# Patient Record
Sex: FEMALE | Race: OTHER | HISPANIC OR LATINO
[De-identification: names, ages, dates, MRNs, and addresses within clinical notes are randomized per-mention and may not be internally consistent; named-entity substitution may affect disease eponyms.]

---

## 2020-08-05 ENCOUNTER — APPOINTMENT (OUTPATIENT)
Dept: OBGYN | Facility: CLINIC | Age: 30
End: 2020-08-05

## 2020-09-02 ENCOUNTER — APPOINTMENT (OUTPATIENT)
Dept: OBGYN | Facility: CLINIC | Age: 30
End: 2020-09-02

## 2021-01-25 ENCOUNTER — APPOINTMENT (OUTPATIENT)
Dept: OBGYN | Facility: CLINIC | Age: 31
End: 2021-01-25
Payer: COMMERCIAL

## 2021-01-25 VITALS
DIASTOLIC BLOOD PRESSURE: 76 MMHG | WEIGHT: 161 LBS | HEIGHT: 64 IN | TEMPERATURE: 97 F | BODY MASS INDEX: 27.49 KG/M2 | SYSTOLIC BLOOD PRESSURE: 100 MMHG

## 2021-01-25 DIAGNOSIS — Z82.49 FAMILY HISTORY OF ISCHEMIC HEART DISEASE AND OTHER DISEASES OF THE CIRCULATORY SYSTEM: ICD-10-CM

## 2021-01-25 DIAGNOSIS — Z01.419 ENCOUNTER FOR GYNECOLOGICAL EXAMINATION (GENERAL) (ROUTINE) W/OUT ABNORMAL FINDINGS: ICD-10-CM

## 2021-01-25 PROCEDURE — 36415 COLL VENOUS BLD VENIPUNCTURE: CPT

## 2021-01-25 PROCEDURE — 99072 ADDL SUPL MATRL&STAF TM PHE: CPT

## 2021-01-25 PROCEDURE — 99385 PREV VISIT NEW AGE 18-39: CPT

## 2021-01-25 RX ORDER — MULTIVITAMIN
TABLET ORAL
Refills: 0 | Status: ACTIVE | COMMUNITY

## 2021-01-25 RX ORDER — CHROMIUM 200 MCG
TABLET ORAL
Refills: 0 | Status: ACTIVE | COMMUNITY

## 2021-01-25 NOTE — HISTORY OF PRESENT ILLNESS
[Patient reported PAP Smear was normal] : Patient reported PAP Smear was normal [LMP unknown] : LMP unknown [IUD] : has an intrauterine device [N] : Patient is not sexually active [Y] : Positive pregnancy history [unknown] : Patient is unsure of the date of her LMP [Menarche Age: ____] : age at menarche was [unfilled] [Previously active] : previously active [Men] : men [Vaginal] : vaginal [Patient would like to be screened for STIs] : Patient would like to be screened for STIs [TextBox_4] : 30yo P1 LMP Mirena IUD ( inserted 1 year ago)  new pt presents for annual GYN exam requesting full STD testing\par \par Pt had a Mirena IUD prior to this one- this IUD causing more spotting = pt states she gets an annual pelvic sono at prior practice requesting to continue that protocol\par \par Pt denies any history of abnormal pap smears [PapSmeardate] : 01/01/20 [de-identified] : MIRENA [TextBox_31] : AS PER PATIENT  [PGxTotal] : 1 [[facility] [[facility]] : [facility]

## 2021-01-26 LAB
C TRACH RRNA SPEC QL NAA+PROBE: NOT DETECTED
HAV IGM SER QL: NONREACTIVE
HBV CORE IGM SER QL: NONREACTIVE
HBV SURFACE AG SER QL: NONREACTIVE
HCV AB SER QL: NONREACTIVE
HCV S/CO RATIO: 0.1 S/CO
HIV1+2 AB SPEC QL IA.RAPID: NONREACTIVE
HSV 1+2 IGG SER IA-IMP: NEGATIVE
HSV 1+2 IGG SER IA-IMP: POSITIVE
HSV1 IGG SER QL: 4.27 INDEX
HSV2 IGG SER QL: 0.15 INDEX
N GONORRHOEA RRNA SPEC QL NAA+PROBE: NOT DETECTED
SOURCE TP AMPLIFICATION: NORMAL
T PALLIDUM AB SER QL IA: NEGATIVE

## 2021-01-29 LAB — CYTOLOGY CVX/VAG DOC THIN PREP: ABNORMAL

## 2021-02-23 ENCOUNTER — APPOINTMENT (OUTPATIENT)
Dept: OBGYN | Facility: CLINIC | Age: 31
End: 2021-02-23

## 2021-03-01 ENCOUNTER — APPOINTMENT (OUTPATIENT)
Dept: OBGYN | Facility: CLINIC | Age: 31
End: 2021-03-01

## 2021-03-16 ENCOUNTER — APPOINTMENT (OUTPATIENT)
Dept: OBGYN | Facility: CLINIC | Age: 31
End: 2021-03-16
Payer: COMMERCIAL

## 2021-03-16 ENCOUNTER — ASOB RESULT (OUTPATIENT)
Age: 31
End: 2021-03-16

## 2021-03-16 VITALS
BODY MASS INDEX: 27.31 KG/M2 | HEIGHT: 64 IN | TEMPERATURE: 97 F | DIASTOLIC BLOOD PRESSURE: 65 MMHG | WEIGHT: 160 LBS | SYSTOLIC BLOOD PRESSURE: 110 MMHG

## 2021-03-16 PROCEDURE — 76830 TRANSVAGINAL US NON-OB: CPT

## 2021-03-16 PROCEDURE — 99072 ADDL SUPL MATRL&STAF TM PHE: CPT

## 2021-03-16 PROCEDURE — 99212 OFFICE O/P EST SF 10 MIN: CPT | Mod: 25

## 2021-03-16 PROCEDURE — 76376 3D RENDER W/INTRP POSTPROCES: CPT | Mod: 59

## 2021-03-16 NOTE — COUNSELING
[Contraception/ Emergency Contraception/ Safe Sexual Practices] : contraception, emergency contraception, safe sexual practices [STD (testing, results, tx)] : STD (testing, results, tx) [FreeTextEntry2] : HIV  post test counseling performed

## 2021-03-16 NOTE — HISTORY OF PRESENT ILLNESS
[Patient reported PAP Smear was normal] : Patient reported PAP Smear was normal [Currently Active] : currently active [Men] : men [TextBox_4] : Pt presents for pelvic sonogram results - scan ordered for spotting with IUD- mirena\par \par Pt made aware her IUD( mirena IUD inserted 1 yr ago) is in situ-normal uterus and right ovary with small 2.3 cm complex left ovarian cyst\par \par Pt denies any pelvic pain presently- wishes to continue with IUD-\par \par Pt reassured\par \par STD screening results reviewed- pt with hx oral HSV- safe sex practices reviewed [PapSmeardate] : 01/25/2021 [TextBox_31] : neg

## 2021-03-19 ENCOUNTER — APPOINTMENT (OUTPATIENT)
Dept: FAMILY MEDICINE | Facility: CLINIC | Age: 31
End: 2021-03-19

## 2021-09-14 ENCOUNTER — APPOINTMENT (OUTPATIENT)
Dept: OBGYN | Facility: CLINIC | Age: 31
End: 2021-09-14
Payer: COMMERCIAL

## 2021-09-14 ENCOUNTER — NON-APPOINTMENT (OUTPATIENT)
Age: 31
End: 2021-09-14

## 2021-09-14 ENCOUNTER — ASOB RESULT (OUTPATIENT)
Age: 31
End: 2021-09-14

## 2021-09-14 VITALS
HEIGHT: 64 IN | DIASTOLIC BLOOD PRESSURE: 72 MMHG | SYSTOLIC BLOOD PRESSURE: 118 MMHG | BODY MASS INDEX: 27.66 KG/M2 | WEIGHT: 162 LBS

## 2021-09-14 DIAGNOSIS — Z30.431 ENCOUNTER FOR ROUTINE CHECKING OF INTRAUTERINE CONTRACEPTIVE DEVICE: ICD-10-CM

## 2021-09-14 PROCEDURE — 99212 OFFICE O/P EST SF 10 MIN: CPT | Mod: 25

## 2021-09-14 PROCEDURE — 76830 TRANSVAGINAL US NON-OB: CPT

## 2021-09-14 PROCEDURE — 76376 3D RENDER W/INTRP POSTPROCES: CPT | Mod: 59

## 2021-09-14 NOTE — HISTORY OF PRESENT ILLNESS
[IUD] : has an intrauterine device [Y] : Positive pregnancy history [Menarche Age: ____] : age at menarche was [unfilled] [Currently Active] : currently active [Men] : men [Vaginal] : vaginal [No] : No [Patient refuses STI testing] : Patient refuses STI testing [LMP unknown] : LMP unknown [unknown] : Patient is unsure of the date of her LMP [PapSmeardate] : 1/25/21 [TextBox_4] : Patient presents for pelvic sonogram results for IUD check and ovarian cyst surveillance\par \par  patient is feeling well denies any pelvic pain or heavy bleeding\par \par \par She is very happy with her Mirena IUD inserted in 2020\par \par Pelvic sonogram is normal with IUD noted to be in situ and normal ovaries and uterus-resolution of previously noted ovarian cyst\par \par  [TextBox_31] : neg [GonorrheaDate] : 1/25/21 [TextBox_63] : neg [ChlamydiaDate] : 1/25/21 [TextBox_68] : neg [PGxTotal] : 1 [[facility]] : 1 [[facility]] : 1

## 2021-09-14 NOTE — PLAN
[FreeTextEntry1] : Follow-up for annual exam in February 2022-patient wishes to have a screening pelvic ultrasound annually at the time of her Pap to check her IUD\par \par Warning signs for malpositioned IUD were reviewed with the patient who verbalized good understanding

## 2022-04-01 ENCOUNTER — APPOINTMENT (OUTPATIENT)
Dept: POPULATION HEALTH | Facility: CLINIC | Age: 32
End: 2022-04-01

## 2022-10-03 ENCOUNTER — LABORATORY RESULT (OUTPATIENT)
Age: 32
End: 2022-10-03

## 2022-10-03 ENCOUNTER — APPOINTMENT (OUTPATIENT)
Dept: OBGYN | Facility: CLINIC | Age: 32
End: 2022-10-03

## 2022-10-03 VITALS
BODY MASS INDEX: 27.83 KG/M2 | HEIGHT: 64 IN | WEIGHT: 163 LBS | DIASTOLIC BLOOD PRESSURE: 62 MMHG | SYSTOLIC BLOOD PRESSURE: 124 MMHG

## 2022-10-03 DIAGNOSIS — Z01.419 ENCOUNTER FOR GYNECOLOGICAL EXAMINATION (GENERAL) (ROUTINE) W/OUT ABNORMAL FINDINGS: ICD-10-CM

## 2022-10-03 DIAGNOSIS — N94.10 UNSPECIFIED DYSPAREUNIA: ICD-10-CM

## 2022-10-03 DIAGNOSIS — A64 UNSPECIFIED SEXUALLY TRANSMITTED DISEASE: ICD-10-CM

## 2022-10-03 LAB
BILIRUB UR QL STRIP: NORMAL
GLUCOSE UR-MCNC: NORMAL
HCG UR QL: 1 EU/DL
HGB UR QL STRIP.AUTO: ABNORMAL
KETONES UR-MCNC: ABNORMAL
LEUKOCYTE ESTERASE UR QL STRIP: NORMAL
NITRITE UR QL STRIP: NORMAL
PH UR STRIP: 5.5
PROT UR STRIP-MCNC: NORMAL
SP GR UR STRIP: 1.02

## 2022-10-03 PROCEDURE — 81003 URINALYSIS AUTO W/O SCOPE: CPT | Mod: QW

## 2022-10-03 PROCEDURE — 99395 PREV VISIT EST AGE 18-39: CPT

## 2022-10-03 NOTE — PLAN
[FreeTextEntry1] : Follow-up in 1 year or sooner as needed follow-up for pelvic sonogram for any pain with sexual intercourse reviewed with patient.  Warning signs and precautions for immediate ER attention were outlined\par \par

## 2022-10-03 NOTE — PHYSICAL EXAM
[Chaperone Present] : A chaperone was present in the examining room during all aspects of the physical examination [Appropriately responsive] : appropriately responsive [Alert] : alert [No Acute Distress] : no acute distress [No Lymphadenopathy] : no lymphadenopathy [Regular Rate Rhythm] : regular rate rhythm [No Murmurs] : no murmurs [Clear to Auscultation B/L] : clear to auscultation bilaterally [Soft] : soft [Non-tender] : non-tender [Non-distended] : non-distended [No HSM] : No HSM [No Lesions] : no lesions [No Mass] : no mass [Oriented x3] : oriented x3 [Examination Of The Breasts] : a normal appearance [No Masses] : no breast masses were palpable [Labia Majora] : normal [Labia Minora] : normal [IUD String] : an IUD string was noted [Normal] : normal [Uterine Adnexae] : normal [FreeTextEntry6] : No rebound no guarding no pain elicited on exam no sign of any acute abdominal symptoms

## 2022-10-03 NOTE — HISTORY OF PRESENT ILLNESS
[IUD] : has an intrauterine device [Y] : Positive pregnancy history [Menarche Age: ____] : age at menarche was [unfilled] [Currently Active] : currently active [LMP unknown] : LMP unknown [unknown] : Patient is unsure of the date of her LMP [FreeTextEntry1] : 32-year-old P1 LMP Mirena IUD inserted 2020 at outside practice presents for annual gynecological exam.  She has been very satisfied with her IUD this is her second Mirena.  Patient denies any heavy bleeding and has intermittent spotting\par \par She gets some mild cramping in the lower mid pelvic area after intercourse at times.  She does have a history of an ovarian cyst in the past.  She has no present pain symptoms currently.  I did advise she have a sonogram to check the placement of her IUD for any pelvic pain and patient verbalizes good understanding.\par \par Patient states she is currently being treated for a UTI and a bowel infection with Keflex ordered by her GI.  We will send urine culture for precaution.  She was started on IBS medication and they are planning to possibly do a colonoscopy related to her symptoms of constipation\par \par  [PapSmeardate] : 01/25/21 [TextBox_31] : NEG [GonorrheaDate] : 01/25/21 [TextBox_63] : NEG [ChlamydiaDate] : 01/25/21 [TextBox_68] : NEG [de-identified] : MIRENA-2020 [PGxTotal] : 1 [[facility]] : 1 [[facility]] : 1

## 2022-10-16 LAB
CYTOLOGY CVX/VAG DOC THIN PREP: ABNORMAL
HPV HIGH+LOW RISK DNA PNL CVX: DETECTED

## 2022-10-18 ENCOUNTER — NON-APPOINTMENT (OUTPATIENT)
Age: 32
End: 2022-10-18

## 2022-10-27 ENCOUNTER — APPOINTMENT (OUTPATIENT)
Dept: OBGYN | Facility: CLINIC | Age: 32
End: 2022-10-27

## 2022-10-27 VITALS
DIASTOLIC BLOOD PRESSURE: 74 MMHG | WEIGHT: 160 LBS | BODY MASS INDEX: 27.31 KG/M2 | SYSTOLIC BLOOD PRESSURE: 114 MMHG | HEIGHT: 64 IN

## 2022-10-27 LAB
HCG UR QL: NEGATIVE
QUALITY CONTROL: YES

## 2022-10-27 PROCEDURE — 57454 BX/CURETT OF CERVIX W/SCOPE: CPT

## 2022-10-27 PROCEDURE — 81025 URINE PREGNANCY TEST: CPT

## 2022-10-27 NOTE — PROCEDURE
[Colposcopy] : Colposcopy  [ASCUS] : ASCUS [HPV High Risk] : HPV high risk [Risks] : risks [Benefits] : benefits [Alternatives] : alternatives [Patient] : patient [Infection] : infection [Bleeding] : bleeding [Allergic Reaction] : allergic reaction [No Premedication] : no premedication [Colposcopy Adequate] : colposcopy adequate [Pap Performed] : pap performed [SCI Fully Visualized] : SCI fully visualized [ECC Performed] : ECC performed [No Abnormalities] : no abnormalities [Biopsy] : biopsy taken [Hemostasis Obtained] : Hemostasis obtained [Tolerated Well] : the patient tolerated the procedure well [de-identified] : 2 [de-identified] : ECC\par 7 oclock [de-identified] : acetowhite lesion note at 7oclock

## 2022-11-03 LAB — CORE LAB BIOPSY: NORMAL

## 2022-11-07 ENCOUNTER — APPOINTMENT (OUTPATIENT)
Dept: ANTEPARTUM | Facility: CLINIC | Age: 32
End: 2022-11-07

## 2022-11-07 ENCOUNTER — APPOINTMENT (OUTPATIENT)
Dept: OBGYN | Facility: CLINIC | Age: 32
End: 2022-11-07

## 2022-11-18 ENCOUNTER — NON-APPOINTMENT (OUTPATIENT)
Age: 32
End: 2022-11-18

## 2022-11-21 ENCOUNTER — NON-APPOINTMENT (OUTPATIENT)
Age: 32
End: 2022-11-21

## 2023-02-07 ENCOUNTER — APPOINTMENT (OUTPATIENT)
Dept: ANTEPARTUM | Facility: CLINIC | Age: 33
End: 2023-02-07
Payer: COMMERCIAL

## 2023-02-07 ENCOUNTER — APPOINTMENT (OUTPATIENT)
Dept: OBGYN | Facility: CLINIC | Age: 33
End: 2023-02-07

## 2023-02-07 PROCEDURE — 76830 TRANSVAGINAL US NON-OB: CPT

## 2023-03-08 ENCOUNTER — NON-APPOINTMENT (OUTPATIENT)
Age: 33
End: 2023-03-08

## 2023-04-29 ENCOUNTER — LABORATORY RESULT (OUTPATIENT)
Age: 33
End: 2023-04-29

## 2023-04-29 ENCOUNTER — APPOINTMENT (OUTPATIENT)
Dept: OBGYN | Facility: CLINIC | Age: 33
End: 2023-04-29
Payer: COMMERCIAL

## 2023-04-29 VITALS
SYSTOLIC BLOOD PRESSURE: 116 MMHG | WEIGHT: 160 LBS | BODY MASS INDEX: 27.31 KG/M2 | HEIGHT: 64 IN | DIASTOLIC BLOOD PRESSURE: 72 MMHG

## 2023-04-29 DIAGNOSIS — Z12.4 ENCOUNTER FOR SCREENING FOR MALIGNANT NEOPLASM OF CERVIX: ICD-10-CM

## 2023-04-29 DIAGNOSIS — R87.810 ATYPICAL SQUAMOUS CELLS OF UNDETERMINED SIGNIFICANCE ON CYTOLOGIC SMEAR OF CERVIX (ASC-US): ICD-10-CM

## 2023-04-29 DIAGNOSIS — R87.610 ATYPICAL SQUAMOUS CELLS OF UNDETERMINED SIGNIFICANCE ON CYTOLOGIC SMEAR OF CERVIX (ASC-US): ICD-10-CM

## 2023-04-29 PROCEDURE — 99214 OFFICE O/P EST MOD 30 MIN: CPT

## 2023-04-29 NOTE — REASON FOR VISIT
Sickle Cell Crisis: Care Instructions  Your Care Instructions    Sickle cell crisis is a painful episode that may begin suddenly in a person with sickle cell disease. Sickle cell disease turns normal, round red blood cells into cells that look like jeane or crescent moons. The sickle-shaped cells can get stuck in blood vessels, blocking blood flow and causing severe pain. The pain can occur in the bones of the spine, the arms and legs, the chest, and the abdomen. An episode may be called a \"painful event\" or \"painful crisis. \" Some people who have sickle cell disease have many painful events, while others have few or none. Treatment depends on the level of pain and how long it lasts. Sometimes taking nonprescription pain relievers can help. Or you may need stronger pain relief medicine that is prescribed or given by a doctor. You may need to be treated in the hospital.  It isn't always possible to know what sets off a painful event. But triggers include being dehydrated, cold temperatures, infection, stress, and not getting enough oxygen. Follow-up care is a key part of your treatment and safety. Be sure to make and go to all appointments, and call your doctor if you are having problems. It's also a good idea to know your test results and keep a list of the medicines you take. How can you care for yourself at home? · Create a pain management plan with your doctor. This plan should include the types of medicines you can take and other actions you can take at home to relieve pain. · Drink plenty of fluids, enough so that your urine is light yellow or clear like water. If you have kidney, heart, or liver disease and have to limit fluids, talk with your doctor before you increase the amount of fluids you drink. · Take your medicines exactly as prescribed. Call your doctor if you think you are having a problem with your medicine. · Take pain medicines exactly as directed.   ¨ If the doctor gave you a prescription medicine for pain, take it as prescribed. ¨ If you are not taking a prescription pain medicine, ask your doctor if you can take an over-the-counter medicine. · Avoid alcohol. It can make you dehydrated. · Dress warmly in cold weather. The cold and windy weather can lead to severe pain. · Do not smoke. Smoking can reduce the amount of oxygen in your blood. · Get plenty of sleep. When should you call for help? Call 911 anytime you think you may need emergency care. For example, call if:  · You passed out (lost consciousness). Call your doctor now or seek immediate medical care if:  · You are in severe pain. · You are dizzy or lightheaded, or you feel like you may faint. · You have a fever. · You are short of breath. · Your symptoms are getting worse. Watch closely for changes in your health, and be sure to contact your doctor if you are not getting better as expected. Where can you learn more? Go to http://manuel-amadeo.info/. Enter F104 in the search box to learn more about \"Sickle Cell Crisis: Care Instructions. \"  Current as of: October 13, 2016  Content Version: 11.3  © 5596-7050 [employer], [employer]. Care instructions adapted under license by [employer] (which disclaims liability or warranty for this information). If you have questions about a medical condition or this instruction, always ask your healthcare professional. [place] [address] any warranty or liability for your use of this information. [Follow-Up] : a follow-up evaluation of [FreeTextEntry2] : 6mo rpt pap

## 2023-04-29 NOTE — HISTORY OF PRESENT ILLNESS
[LMP unknown] : LMP unknown [N] : Patient reports normal menses [IUD] : has an intrauterine device [Y] : Positive pregnancy history [unknown] : Patient is unsure of the date of her LMP [Menarche Age: ____] : age at menarche was [unfilled] [No] : Patient does not have concerns regarding sex [Currently Active] : currently active [FreeTextEntry1] : Pt presents for repeat pap-6 month states she was advised by Dr. Mckenzie to have 6 month pap s/p colpo 10/27/22\par \par Pap 10/3/22 ASCUS HPV pos \par Colpo 10/27/22 colpo- benign\par \par Denies AUB or PCB [PapSmeardate] : 10/03/22 [TextBox_31] : ASCUS [HIVDate] : 01/25/21 [TextBox_53] : NEG [SyphilisDate] : 01/25/21 [TextBox_58] : NEG [GonorrheaDate] : 01/25/21 [TextBox_63] : NEG [ChlamydiaDate] : 01/25/21 [TextBox_68] : NEG [HPVDate] : 10/03/22 [TextBox_78] : 18, [birth year] - NEG, HPVHR - DETECTED [HepatitisBDate] : 01/25/22 [TextBox_83] : NEG [HepatitisCDate] : 01/25/22 [TextBox_88] : NEG  [de-identified] : Mirena 2020 [PGxTotal] : 1 [[facility]] : 1 [[facility]] : [facility]

## 2023-05-13 ENCOUNTER — NON-APPOINTMENT (OUTPATIENT)
Age: 33
End: 2023-05-13

## 2023-05-29 ENCOUNTER — EMERGENCY (EMERGENCY)
Facility: HOSPITAL | Age: 33
LOS: 1 days | Discharge: HOME | End: 2023-05-29
Attending: EMERGENCY MEDICINE
Payer: COMMERCIAL

## 2023-05-29 VITALS
SYSTOLIC BLOOD PRESSURE: 118 MMHG | WEIGHT: 134.92 LBS | OXYGEN SATURATION: 99 % | HEART RATE: 91 BPM | DIASTOLIC BLOOD PRESSURE: 75 MMHG | TEMPERATURE: 99 F | RESPIRATION RATE: 18 BRPM | HEIGHT: 68 IN

## 2023-05-29 LAB
APPEARANCE UR: CLEAR — SIGNIFICANT CHANGE UP
BACTERIA # UR AUTO: ABNORMAL
BILIRUB UR-MCNC: NEGATIVE — SIGNIFICANT CHANGE UP
COLOR SPEC: YELLOW — SIGNIFICANT CHANGE UP
DIFF PNL FLD: NEGATIVE — SIGNIFICANT CHANGE UP
EPI CELLS # UR: SIGNIFICANT CHANGE UP
GLUCOSE UR QL: NEGATIVE — SIGNIFICANT CHANGE UP
HCG UR QL: NEGATIVE — SIGNIFICANT CHANGE UP
KETONES UR-MCNC: NEGATIVE — SIGNIFICANT CHANGE UP
LEUKOCYTE ESTERASE UR-ACNC: NEGATIVE — SIGNIFICANT CHANGE UP
NITRITE UR-MCNC: POSITIVE
PH UR: 8 — SIGNIFICANT CHANGE UP (ref 5–8)
PROT UR-MCNC: NEGATIVE — SIGNIFICANT CHANGE UP
RBC CASTS # UR COMP ASSIST: SIGNIFICANT CHANGE UP /HPF (ref 0–4)
SP GR SPEC: 1.01 — SIGNIFICANT CHANGE UP (ref 1.01–1.02)
UROBILINOGEN FLD QL: 1
WBC UR QL: SIGNIFICANT CHANGE UP /HPF (ref 0–5)

## 2023-05-29 PROCEDURE — 81001 URINALYSIS AUTO W/SCOPE: CPT

## 2023-05-29 PROCEDURE — 99284 EMERGENCY DEPT VISIT MOD MDM: CPT

## 2023-05-29 PROCEDURE — 76830 TRANSVAGINAL US NON-OB: CPT | Mod: 26

## 2023-05-29 PROCEDURE — 99284 EMERGENCY DEPT VISIT MOD MDM: CPT | Mod: 25

## 2023-05-29 PROCEDURE — 76856 US EXAM PELVIC COMPLETE: CPT

## 2023-05-29 PROCEDURE — 81025 URINE PREGNANCY TEST: CPT

## 2023-05-29 PROCEDURE — 76856 US EXAM PELVIC COMPLETE: CPT | Mod: 26

## 2023-05-29 PROCEDURE — 87186 SC STD MICRODIL/AGAR DIL: CPT

## 2023-05-29 PROCEDURE — 76830 TRANSVAGINAL US NON-OB: CPT

## 2023-05-29 PROCEDURE — 87086 URINE CULTURE/COLONY COUNT: CPT

## 2023-05-29 RX ORDER — IBUPROFEN 200 MG
800 TABLET ORAL ONCE
Refills: 0 | Status: COMPLETED | OUTPATIENT
Start: 2023-05-29 | End: 2023-05-29

## 2023-05-29 RX ADMIN — Medication 800 MILLIGRAM(S): at 20:57

## 2023-05-29 NOTE — ED ADULT TRIAGE NOTE - HEIGHT IN CM
Test of cure will be sent out today and you will be notified of results.  Remember to always use condoms when engaging in sexual activity.  Follow up with PCP as needed.  
172.72

## 2023-05-29 NOTE — ED STATDOCS - PATIENT PORTAL LINK FT
You can access the FollowMyHealth Patient Portal offered by [facility] by registering at the following website: [email or web address]. By joining [employer]’[facility] FollowMyHealth portal, you will also be able to view your health information using other applications (apps) compatible with our system.

## 2023-05-29 NOTE — ED ADULT TRIAGE NOTE - CHIEF COMPLAINT QUOTE
pt a+ox3, c/o 10/10 pelvic pain x 1 hour. states she has IUD in place but cannot feel string any more.

## 2023-05-29 NOTE — ED ADULT NURSE NOTE - NSFALLUNIVINTERV_ED_ALL_ED
Bed/Stretcher in lowest position, wheels locked, appropriate side rails in place/Call bell, personal items and telephone in reach/Instruct patient to call for assistance before getting out of bed/chair/stretcher/Non-slip footwear applied when patient is off stretcher/[place] to call system/Physically safe environment - no spills, clutter or unnecessary equipment/Purposeful proactive rounding/Room/bathroom lighting operational, light cord in reach

## 2023-05-29 NOTE — ED STATDOCS - OBJECTIVE STATEMENT
32 y/o female with PMHx of ovarian cyst presents to the ED c/o intermittent pelvic cramping for the past few hours. Pt states pain started during intercourse earlier this evening. Pt took 1000mg Tylenol PTA. Pt notes hx of IUD, states she can no longer feel string which she states she normally can.

## 2023-05-29 NOTE — ED STATDOCS - CLINICAL SUMMARY MEDICAL DECISION MAKING FREE TEXT BOX
34 y/o female presents to the ED c/o pelvic pain will check UA, US to evaluate for ovarian cyst vs r/o torsion 34 y/o female presents to the ED c/o pelvic pain will check UA, US to evaluate for ovarian cyst vs r/o torsion    HARLEY Box [id number]: Unremarkable work up. Medically stable for discharge.

## 2023-05-29 NOTE — ED STATDOCS - PROGRESS NOTE DETAILS
HARLEY Box: Patient evaluated by intake physician. HPI/ROS/PE as noted above. Will follow up plan per intake physician and continue to assess patient. HARLEY Box: Pain resolved.

## 2023-05-29 NOTE — ED ADULT TRIAGE NOTE - GLASGOW COMA SCALE: EYE OPENING, MLM
Please be informed that you have only been offered emergency medicaid which will be unable to cover aftercare treatment. The clinic is requesting proof that you are not working, if not the cost to the visit is $80.00. Discharge summary will be faxed following your discharge./Mental Health Treatment (E4) spontaneous

## 2023-05-29 NOTE — ED STATDOCS - NS ED ATTENDING STATEMENT MOD
This was a shared visit with the DOMO. I reviewed and verified the documentation and independently performed the documented:

## 2023-05-29 NOTE — ED STATDOCS - ATTENDING APP SHARED VISIT CONTRIBUTION OF CARE
This was a shared visit with DOMO. I reviewed and verified the documentation and independently performed the documented history/exam/mdm.

## 2023-06-06 ENCOUNTER — NON-APPOINTMENT (OUTPATIENT)
Age: 33
End: 2023-06-06

## 2023-06-06 LAB
CYTOLOGY CVX/VAG DOC THIN PREP: ABNORMAL
HPV HIGH+LOW RISK DNA PNL CVX: DETECTED

## 2023-06-07 RX ORDER — CEPHALEXIN 500 MG
1 CAPSULE ORAL
Qty: 28 | Refills: 0
Start: 2023-06-07 | End: 2023-06-13

## 2023-06-07 NOTE — ED POST DISCHARGE NOTE - RESULT SUMMARY
Pt called for VM that was left. Urine cx >100K E coli pan sensitive. Rx sent for keflex. Confirmed no allergies per pt.

## 2024-04-17 ENCOUNTER — INPATIENT (INPATIENT)
Facility: HOSPITAL | Age: 34
LOS: 5 days | Discharge: HOME | DRG: 440 | End: 2024-04-23
Attending: SURGERY | Admitting: HOSPITALIST
Payer: COMMERCIAL

## 2024-04-17 VITALS
WEIGHT: 167.55 LBS | TEMPERATURE: 98 F | OXYGEN SATURATION: 98 % | SYSTOLIC BLOOD PRESSURE: 126 MMHG | HEART RATE: 67 BPM | RESPIRATION RATE: 20 BRPM | HEIGHT: 64 IN | DIASTOLIC BLOOD PRESSURE: 72 MMHG

## 2024-04-17 LAB
ALBUMIN SERPL ELPH-MCNC: 4.5 G/DL — SIGNIFICANT CHANGE UP (ref 3.3–5.2)
ALP SERPL-CCNC: 102 U/L — SIGNIFICANT CHANGE UP (ref 40–120)
ALT FLD-CCNC: 245 U/L — HIGH
ANION GAP SERPL CALC-SCNC: 17 MMOL/L — SIGNIFICANT CHANGE UP (ref 5–17)
AST SERPL-CCNC: 400 U/L — HIGH
BASOPHILS # BLD AUTO: 0.06 K/UL — SIGNIFICANT CHANGE UP (ref 0–0.2)
BASOPHILS NFR BLD AUTO: 0.3 % — SIGNIFICANT CHANGE UP (ref 0–2)
BILIRUB SERPL-MCNC: 1.5 MG/DL — SIGNIFICANT CHANGE UP (ref 0.4–2)
BUN SERPL-MCNC: 15.2 MG/DL — SIGNIFICANT CHANGE UP (ref 8–20)
CALCIUM SERPL-MCNC: 9.7 MG/DL — SIGNIFICANT CHANGE UP (ref 8.4–10.5)
CHLORIDE SERPL-SCNC: 99 MMOL/L — SIGNIFICANT CHANGE UP (ref 96–108)
CO2 SERPL-SCNC: 23 MMOL/L — SIGNIFICANT CHANGE UP (ref 22–29)
CREAT SERPL-MCNC: 0.79 MG/DL — SIGNIFICANT CHANGE UP (ref 0.5–1.3)
EGFR: 102 ML/MIN/1.73M2 — SIGNIFICANT CHANGE UP
EOSINOPHIL # BLD AUTO: 0.05 K/UL — SIGNIFICANT CHANGE UP (ref 0–0.5)
EOSINOPHIL NFR BLD AUTO: 0.2 % — SIGNIFICANT CHANGE UP (ref 0–6)
GLUCOSE SERPL-MCNC: 114 MG/DL — HIGH (ref 70–99)
HCG SERPL-ACNC: <4 MIU/ML — SIGNIFICANT CHANGE UP
HCT VFR BLD CALC: 37.2 % — SIGNIFICANT CHANGE UP (ref 34.5–45)
HGB BLD-MCNC: 12.9 G/DL — SIGNIFICANT CHANGE UP (ref 11.5–15.5)
IMM GRANULOCYTES NFR BLD AUTO: 0.5 % — SIGNIFICANT CHANGE UP (ref 0–0.9)
LIDOCAIN IGE QN: >3000 U/L — HIGH (ref 22–51)
LYMPHOCYTES # BLD AUTO: 1.48 K/UL — SIGNIFICANT CHANGE UP (ref 1–3.3)
LYMPHOCYTES # BLD AUTO: 7.1 % — LOW (ref 13–44)
MCHC RBC-ENTMCNC: 31.2 PG — SIGNIFICANT CHANGE UP (ref 27–34)
MCHC RBC-ENTMCNC: 34.7 GM/DL — SIGNIFICANT CHANGE UP (ref 32–36)
MCV RBC AUTO: 89.9 FL — SIGNIFICANT CHANGE UP (ref 80–100)
MONOCYTES # BLD AUTO: 0.74 K/UL — SIGNIFICANT CHANGE UP (ref 0–0.9)
MONOCYTES NFR BLD AUTO: 3.5 % — SIGNIFICANT CHANGE UP (ref 2–14)
NEUTROPHILS # BLD AUTO: 18.54 K/UL — HIGH (ref 1.8–7.4)
NEUTROPHILS NFR BLD AUTO: 88.4 % — HIGH (ref 43–77)
PLATELET # BLD AUTO: 314 K/UL — SIGNIFICANT CHANGE UP (ref 150–400)
POTASSIUM SERPL-MCNC: 4.2 MMOL/L — SIGNIFICANT CHANGE UP (ref 3.5–5.3)
POTASSIUM SERPL-SCNC: 4.2 MMOL/L — SIGNIFICANT CHANGE UP (ref 3.5–5.3)
PROT SERPL-MCNC: 7.4 G/DL — SIGNIFICANT CHANGE UP (ref 6.6–8.7)
RBC # BLD: 4.14 M/UL — SIGNIFICANT CHANGE UP (ref 3.8–5.2)
RBC # FLD: 12.2 % — SIGNIFICANT CHANGE UP (ref 10.3–14.5)
SODIUM SERPL-SCNC: 139 MMOL/L — SIGNIFICANT CHANGE UP (ref 135–145)
TRIGL SERPL-MCNC: 34 MG/DL — SIGNIFICANT CHANGE UP
WBC # BLD: 20.98 K/UL — HIGH (ref 3.8–10.5)
WBC # FLD AUTO: 20.98 K/UL — HIGH (ref 3.8–10.5)

## 2024-04-17 PROCEDURE — 74177 CT ABD & PELVIS W/CONTRAST: CPT | Mod: 26,MC

## 2024-04-17 PROCEDURE — 76705 ECHO EXAM OF ABDOMEN: CPT | Mod: 26

## 2024-04-17 PROCEDURE — 99285 EMERGENCY DEPT VISIT HI MDM: CPT

## 2024-04-17 RX ORDER — ONDANSETRON 8 MG/1
4 TABLET, FILM COATED ORAL ONCE
Refills: 0 | Status: COMPLETED | OUTPATIENT
Start: 2024-04-17 | End: 2024-04-17

## 2024-04-17 RX ORDER — FAMOTIDINE 10 MG/ML
20 INJECTION INTRAVENOUS ONCE
Refills: 0 | Status: COMPLETED | OUTPATIENT
Start: 2024-04-17 | End: 2024-04-17

## 2024-04-17 RX ORDER — SODIUM CHLORIDE 9 MG/ML
1000 INJECTION INTRAMUSCULAR; INTRAVENOUS; SUBCUTANEOUS ONCE
Refills: 0 | Status: COMPLETED | OUTPATIENT
Start: 2024-04-17 | End: 2024-04-17

## 2024-04-17 RX ORDER — MORPHINE SULFATE 50 MG/1
4 CAPSULE, EXTENDED RELEASE ORAL ONCE
Refills: 0 | Status: DISCONTINUED | OUTPATIENT
Start: 2024-04-17 | End: 2024-04-17

## 2024-04-17 RX ORDER — PANTOPRAZOLE SODIUM 20 MG/1
40 TABLET, DELAYED RELEASE ORAL ONCE
Refills: 0 | Status: COMPLETED | OUTPATIENT
Start: 2024-04-17 | End: 2024-04-17

## 2024-04-17 RX ORDER — ACETAMINOPHEN 500 MG
1000 TABLET ORAL ONCE
Refills: 0 | Status: COMPLETED | OUTPATIENT
Start: 2024-04-17 | End: 2024-04-17

## 2024-04-17 RX ORDER — SUCRALFATE 1 G
1 TABLET ORAL ONCE
Refills: 0 | Status: COMPLETED | OUTPATIENT
Start: 2024-04-17 | End: 2024-04-17

## 2024-04-17 RX ADMIN — Medication 1000 MILLIGRAM(S): at 23:21

## 2024-04-17 RX ADMIN — Medication 1 GRAM(S): at 18:20

## 2024-04-17 RX ADMIN — SODIUM CHLORIDE 1000 MILLILITER(S): 9 INJECTION INTRAMUSCULAR; INTRAVENOUS; SUBCUTANEOUS at 18:20

## 2024-04-17 RX ADMIN — Medication 30 MILLILITER(S): at 18:21

## 2024-04-17 RX ADMIN — PANTOPRAZOLE SODIUM 40 MILLIGRAM(S): 20 TABLET, DELAYED RELEASE ORAL at 20:50

## 2024-04-17 RX ADMIN — Medication 400 MILLIGRAM(S): at 20:49

## 2024-04-17 RX ADMIN — FAMOTIDINE 20 MILLIGRAM(S): 10 INJECTION INTRAVENOUS at 18:21

## 2024-04-17 RX ADMIN — MORPHINE SULFATE 4 MILLIGRAM(S): 50 CAPSULE, EXTENDED RELEASE ORAL at 23:26

## 2024-04-17 RX ADMIN — ONDANSETRON 4 MILLIGRAM(S): 8 TABLET, FILM COATED ORAL at 18:21

## 2024-04-17 RX ADMIN — SODIUM CHLORIDE 1000 MILLILITER(S): 9 INJECTION INTRAMUSCULAR; INTRAVENOUS; SUBCUTANEOUS at 20:49

## 2024-04-17 NOTE — ED PROVIDER NOTE - PHYSICAL EXAMINATION
Const: Awake, alert and oriented. In no acute distress. Well appearing.  HEENT: NC/AT. Moist mucous membranes.  Eyes: No scleral icterus. EOMI.  Neck:. Soft and supple. Full ROM without pain.  Cardiac: Regular rate and regular rhythm. +S1/S2. Peripheral pulses 2+ and symmetric. No LE edema.  Resp: Speaking in full sentences. No evidence of respiratory distress. No wheezes, rales or rhonchi.  Abd: Soft, epigastric ttp, non-distended. Normal bowel sounds in all 4 quadrants. No guarding or rebound.  Back: Spine midline and non-tender. No CVAT.  Skin: No rashes, abrasions or lacerations.  Lymph: No cervical lymphadenopathy.  Neuro: Awake, alert & oriented x 3. Moves all extremities symmetrically.

## 2024-04-17 NOTE — ED PROVIDER NOTE - PROGRESS NOTE DETAILS
HARLEY Queen: pt re-assessed, reporting slight improvement in epigastric pain but still uncomfortable. +TTP epigastric and RUQ. no known hx gallstones. labs notable for elevated WBC 21k, transaminitis and lipase >3000. RUQ US and CT added. another L IVF ordered. will check triglycerides and hep panel. results d/w pt thus far.

## 2024-04-17 NOTE — ED ADULT NURSE NOTE - OBJECTIVE STATEMENT
Pt c/o upper quadrant and back pain. States she was at urgent care, was given Toradol and sent here. C/o nausea. Denies fevers, chills, cp, sob, vomiting, diarrhea

## 2024-04-17 NOTE — ED PROVIDER NOTE - OBJECTIVE STATEMENT
34-year-old female with a past medical history of constipation and gastritis presents with epigastric pain.  Patient states at 3 AM she ate [employer] and then around 9 AM started to have epigastric abdominal pain.  Patient states similar to her gastritis pain but persistent despite taking Tums and Zantac earlier today.  Patient also reports nausea no vomiting.  Patient states she went to urgent care was given Toradol with minimal relief of symptoms.  Patient reports occasional alcohol use but nothing in the past couple of days. Pt denies fevers/chills, ha, loc, focal neuro deficits, cp/sob/palp, cough, v/d, urinary symptoms, recent travel

## 2024-04-17 NOTE — ED PROVIDER NOTE - CLINICAL SUMMARY MEDICAL DECISION MAKING FREE TEXT BOX
34-year-old female with a past medical history of constipation and gastritis presents with epigastric pain.   Patient with epigastric tenderness to palpation on exam will rule out pancreatitis we will do labs, IVF, analgesics, antiemetics 34-year-old female with a past medical history of constipation and gastritis presents with epigastric pain.   Patient with epigastric tenderness to palpation on exam will rule out pancreatitis we will do labs, IVF, analgesics, antiemetics  HARELY Queen: labs notable for elevated WBC 21k, transaminitis and lipase >3000. 34-year-old female with a past medical history of constipation and gastritis presents with epigastric pain.   Patient with epigastric tenderness to palpation on exam will rule out pancreatitis we will do labs, IVF, analgesics, antiemetics  HARLEY Queen: labs notable for elevated WBC 21k, transaminitis and lipase >3000.      pt to be admitted to medical service.

## 2024-04-18 DIAGNOSIS — K85.90 ACUTE PANCREATITIS WITHOUT NECROSIS OR INFECTION, UNSPECIFIED: ICD-10-CM

## 2024-04-18 LAB
ALBUMIN SERPL ELPH-MCNC: 3.7 G/DL — SIGNIFICANT CHANGE UP (ref 3.3–5.2)
ALP SERPL-CCNC: 81 U/L — SIGNIFICANT CHANGE UP (ref 40–120)
ALT FLD-CCNC: 186 U/L — HIGH
ANION GAP SERPL CALC-SCNC: 7 MMOL/L — SIGNIFICANT CHANGE UP (ref 5–17)
AST SERPL-CCNC: 129 U/L — HIGH
BILIRUB SERPL-MCNC: 0.9 MG/DL — SIGNIFICANT CHANGE UP (ref 0.4–2)
BUN SERPL-MCNC: 13.3 MG/DL — SIGNIFICANT CHANGE UP (ref 8–20)
CALCIUM SERPL-MCNC: 8.4 MG/DL — SIGNIFICANT CHANGE UP (ref 8.4–10.5)
CHLORIDE SERPL-SCNC: 106 MMOL/L — SIGNIFICANT CHANGE UP (ref 96–108)
CO2 SERPL-SCNC: 27 MMOL/L — SIGNIFICANT CHANGE UP (ref 22–29)
CREAT SERPL-MCNC: 0.7 MG/DL — SIGNIFICANT CHANGE UP (ref 0.5–1.3)
EGFR: 118 ML/MIN/1.73M2 — SIGNIFICANT CHANGE UP
GLUCOSE SERPL-MCNC: 96 MG/DL — SIGNIFICANT CHANGE UP (ref 70–99)
HAV IGM SER-ACNC: SIGNIFICANT CHANGE UP
HBV CORE IGM SER-ACNC: SIGNIFICANT CHANGE UP
HBV SURFACE AG SER-ACNC: SIGNIFICANT CHANGE UP
HCV AB S/CO SERPL IA: 0.11 S/CO — SIGNIFICANT CHANGE UP (ref 0–0.99)
HCV AB SERPL-IMP: SIGNIFICANT CHANGE UP
POTASSIUM SERPL-MCNC: 3.8 MMOL/L — SIGNIFICANT CHANGE UP (ref 3.5–5.3)
POTASSIUM SERPL-SCNC: 3.8 MMOL/L — SIGNIFICANT CHANGE UP (ref 3.5–5.3)
PROT SERPL-MCNC: 6 G/DL — LOW (ref 6.6–8.7)
SODIUM SERPL-SCNC: 140 MMOL/L — SIGNIFICANT CHANGE UP (ref 135–145)

## 2024-04-18 PROCEDURE — 99223 1ST HOSP IP/OBS HIGH 75: CPT

## 2024-04-18 PROCEDURE — 99222 1ST HOSP IP/OBS MODERATE 55: CPT

## 2024-04-18 RX ORDER — ACETAMINOPHEN 500 MG
650 TABLET ORAL EVERY 6 HOURS
Refills: 0 | Status: DISCONTINUED | OUTPATIENT
Start: 2024-04-18 | End: 2024-04-23

## 2024-04-18 RX ORDER — LANOLIN ALCOHOL/MO/W.PET/CERES
3 CREAM (GRAM) TOPICAL AT BEDTIME
Refills: 0 | Status: DISCONTINUED | OUTPATIENT
Start: 2024-04-18 | End: 2024-04-23

## 2024-04-18 RX ORDER — SODIUM CHLORIDE 9 MG/ML
1000 INJECTION, SOLUTION INTRAVENOUS
Refills: 0 | Status: DISCONTINUED | OUTPATIENT
Start: 2024-04-18 | End: 2024-04-19

## 2024-04-18 RX ORDER — ONDANSETRON 8 MG/1
4 TABLET, FILM COATED ORAL EVERY 8 HOURS
Refills: 0 | Status: DISCONTINUED | OUTPATIENT
Start: 2024-04-18 | End: 2024-04-23

## 2024-04-18 RX ORDER — MORPHINE SULFATE 50 MG/1
2 CAPSULE, EXTENDED RELEASE ORAL EVERY 4 HOURS
Refills: 0 | Status: DISCONTINUED | OUTPATIENT
Start: 2024-04-18 | End: 2024-04-23

## 2024-04-18 RX ADMIN — SODIUM CHLORIDE 130 MILLILITER(S): 9 INJECTION, SOLUTION INTRAVENOUS at 05:08

## 2024-04-18 RX ADMIN — MORPHINE SULFATE 2 MILLIGRAM(S): 50 CAPSULE, EXTENDED RELEASE ORAL at 18:31

## 2024-04-18 RX ADMIN — Medication 650 MILLIGRAM(S): at 13:51

## 2024-04-18 RX ADMIN — MORPHINE SULFATE 2 MILLIGRAM(S): 50 CAPSULE, EXTENDED RELEASE ORAL at 19:00

## 2024-04-18 RX ADMIN — MORPHINE SULFATE 2 MILLIGRAM(S): 50 CAPSULE, EXTENDED RELEASE ORAL at 06:21

## 2024-04-18 RX ADMIN — ONDANSETRON 4 MILLIGRAM(S): 8 TABLET, FILM COATED ORAL at 20:10

## 2024-04-18 RX ADMIN — Medication 650 MILLIGRAM(S): at 22:51

## 2024-04-18 RX ADMIN — Medication 650 MILLIGRAM(S): at 14:45

## 2024-04-18 NOTE — H&P ADULT - NSHPPHYSICALEXAM_GEN_ALL_CORE
Vital Signs Last 24 Hrs  T(C): 36.9 (18 Apr 2024 00:13), Max: 36.9 (18 Apr 2024 00:13)  T(F): 98.5 (18 Apr 2024 00:13), Max: 98.5 (18 Apr 2024 00:13)  HR: 68 (18 Apr 2024 00:13) (67 - 68)  BP: 103/69 (18 Apr 2024 00:13) (103/69 - 126/72)  BP(mean): --  RR: 19 (18 Apr 2024 00:13) (19 - 20)  SpO2: 97% (18 Apr 2024 00:13) (97% - 98%)    Parameters below as of 18 Apr 2024 00:13  Patient On (Oxygen Delivery Method): room air

## 2024-04-18 NOTE — CONSULT NOTE ADULT - ATTENDING COMMENTS
Patient presents w/ likely gallstone pancreatitis.   --NPO, IVF, serial abdominal exams   --Trend vitals, labs  --Likely cholecystectomy this admission; with evidence of resolving pancreatitis.

## 2024-04-18 NOTE — SBIRT NOTE ADULT - NSSBIRTALCPOSREINDET_GEN_A_CORE
Patient reported she will have 2 to 3 glasses of wine per week. Patient reported alcohol use is not an issue. SW offered alcohol use resources, patient declined.

## 2024-04-18 NOTE — H&P ADULT - ASSESSMENT
35 y/o female with PMH of gastritis and abdominoplasty came to the ED complaining of abdominal pain. Pain started yesterday morning, located in the epigastric area, sharp, radiating to the back, associated with nausea. She took Tums, Pepto thinking it was gastritis but no relief. In the ED, CT A/P demonstrating acute pancreatitis, cholelithiasis. US abdomen: multiple small gallstones. Labs; Lipase > 3000, WBC: 20.98 with left shit, AST: 400, ALT: 245      Acute gallstone pancreatitis   Admit to medical floor   CT and US as noted above   Continue hydration   Pain control   Zofran PRN for nausea/vomiting   Surgery following   GI consulted given elevated LFTs     Elevated LFT   As noted above   GI consulted     Leukocytosis   As noted above   Will hold off on antibiotic for now   Trend WBC     Hx of gastritis   Patient takes OTC as needed     Supportive   DVT prophylaxis: ambulate as tolerated   Diet: NPO     Plan of care discussed with patient and significant other at bed side.

## 2024-04-18 NOTE — CONSULT NOTE ADULT - SUBJECTIVE AND OBJECTIVE BOX
SUBJECTIVE: 33 yo F w/ hx of gastritis and abdominoplasty presenting with abdominal pain. Pt states that she has had pain for the past few days that has worsened. Normal bowel function. No fevers/chills. HDS on arrival and afebrile. Labs notable for WBC 21, Lipase > 300, elevated AST/ALT. CT A/P demonstrating acute pancreatitis. RUQ u/s showing gallstones with no wall thickening or pericholecystic fluid. Surgery consulted for recommedations.     Vitals:  T(C): 36.9 (04-18-24 @ 00:13), Max: 36.9 (04-18-24 @ 00:13)  T(F): 98.5 (04-18-24 @ 00:13), Max: 98.5 (04-18-24 @ 00:13)  HR: 68 (04-18-24 @ 00:13) (67 - 68)  BP: 103/69 (04-18-24 @ 00:13) (103/69 - 126/72)  ABP: --  ABP(mean): --  RR: 19 (04-18-24 @ 00:13) (19 - 20)  SpO2: 97% (04-18-24 @ 00:13) (97% - 98%)      Labs:    LABS:  cret                        12.9   20.98 )-----------( 314      ( 17 Apr 2024 18:00 )             37.2     04-17    139  |  99  |  15.2  ----------------------------<  114<H>  4.2   |  23.0  |  0.79    Ca    9.7      17 Apr 2024 18:00    TPro  7.4  /  Alb  4.5  /  TBili  1.5  /  DBili  x   /  AST  400<H>  /  ALT  245<H>  /  AlkPhos  102  04-17        GENERAL: NAD, lying in bed comfortably  HEAD:  Atraumatic, normocephalic  NECK: Supple, no JVD  HEART: RRR  LUNGS: Unlabored respirations. No conversational dyspnea.   ABDOMEN: Soft, tender to palpation in epigastrium, nondistended  EXTREMITIES: No clubbing, cyanosis, or edema  NERVOUS SYSTEM:  A&Ox3, no focal deficits   SKIN: No rashes or lesions    EXAM: CT ABDOMEN AND PELVIS IC ORDERED BY: CHIP WARE    PROCEDURE DATE: 04/17/2024        INTERPRETATION: CLINICAL INFORMATION: Abdominal pain. Evaluate for pancreatitis.    COMPARISON: None.    CONTRAST/COMPLICATIONS:  IV Contrast: Omnipaque 350 90 cc administered  Oral Contrast: NONE  Complications: None reported at time of study completion    PROCEDURE:  CT of the Abdomen and Pelvis was performed.  Sagittal and coronal reformats were performed.    FINDINGS:  LOWER CHEST: Mild bibasilar dependent atelectasis. No pleural effusion.    LIVER: Within normal limits.  BILE DUCTS: Normal caliber.  GALLBLADDER: Cholelithiasis.  SPLEEN: Within normal limits.  PANCREAS: Trace peripancreatic edema/stranding at the level of the body/tail, likely in keeping with enteritis. Correlate with clinical parameters. No peripancreatic abscess.  ADRENALS: Within normal limits.  KIDNEYS/URETERS: Within normal limits.    BLADDER: Within normal limits.  REPRODUCTIVE ORGANS: Intrauterine device in situ. Right adnexal cyst measuring up to 2.5 cm.    BOWEL: No bowel obstruction. Normal appendix.  PERITONEUM: No ascites.  VESSELS: Within normal limits.  RETROPERITONEUM/LYMPH NODES: No lymphadenopathy.  ABDOMINAL WALL: Cutaneous umbilical ring identified.  BONES: Within normal limits.    IMPRESSION:    Acute interstitial pancreatitis. No peripancreatic abscess.    Cholelithiasis.
HISTORY OF PRESENT ILLNESS: This is a 34y old woman with a past medical history significant for gastritis who presents with 1 day of epigastric pain with radiation to the back with nausea. She denies previous episodes of pancreatitis. She reports a history of epigastric pain of less severity attributed to gastritis. She did have modest improvement in her symptoms after the GI cocktail was completed.   She denies family history of pancreas disorder. She denies tobacco use.   She denies family history of autoimmune disorders.  She denies nsaid use.       PAST MEDICAL/SURGICAL HISTORY:  Gastritis      SOCIAL HISTORY:  - TOBACCO: Denies  - ALCOHOL: Social  - ILLICIT DRUG USE: Denies    FAMILY HISTORY:  No known history of gastrointestinal or liver disease;  FH: HTN (hypertension) (Mother)    Family history of diabetes mellitus (DM) (Mother)        HOME MEDICATIONS:    INPATIENT MEDICATIONS:  MEDICATIONS  (STANDING):  lactated ringers. 1000 milliLiter(s) (130 mL/Hr) IV Continuous <Continuous>    MEDICATIONS  (PRN):  acetaminophen     Tablet .. 650 milliGRAM(s) Oral every 6 hours PRN Temp greater or equal to 38C (100.4F), Mild Pain (1 - 3)  aluminum hydroxide/magnesium hydroxide/simethicone Suspension 30 milliLiter(s) Oral every 4 hours PRN Dyspepsia  melatonin 3 milliGRAM(s) Oral at bedtime PRN Insomnia  morphine  - Injectable 2 milliGRAM(s) IV Push every 4 hours PRN Severe Pain (7 - 10)  ondansetron Injectable 4 milliGRAM(s) IV Push every 8 hours PRN Nausea and/or Vomiting    ALLERGIES:  No Known Allergies    T(C): 36.7 (04-18-24 @ 07:58), Max: 36.9 (04-18-24 @ 00:13)  HR: 75 (04-18-24 @ 07:58) (67 - 75)  BP: 102/67 (04-18-24 @ 07:58) (96/85 - 126/72)  RR: 18 (04-18-24 @ 07:58) (18 - 20)  SpO2: 97% (04-18-24 @ 07:58) (97% - 98%)      PHYSICAL EXAM:  Constitutional: in no apparent distress  Eyes: Sclerae anicteric, conjunctivae normal  ENMT: Mucus membranes moist, no oropharyngeal thrush noted  Respiratory: Breathing nonlabored; clear to auscultation  Cardiovascular: Regular rate  Gastrointestinal: Soft, epigastric tenderness, nondistended, normoactive bowel sounds; no rebound tenderness or involuntary guarding  Extremities: No edema  Neurological: Alert and oriented to person, place and time;   Skin: No jaundice  Lymph Nodes: No significant lymphadenopathy  Musculoskeletal: No significant peripheral atrophy  Psychiatric: Affect and mood appropriate      LABS:             12.9   20.98 )-----------( 314      ( 04-17 @ 18:00 )             37.2         04-18    140  |  106  |  13.3  ----------------------------<  96  3.8   |  27.0  |  0.70    Ca    8.4      18 Apr 2024 06:04    TPro  6.0<L>  /  Alb  3.7  /  TBili  0.9  /  DBili  x   /  AST  129<H>  /  ALT  186<H>  /  AlkPhos  81  04-18    LIVER FUNCTIONS - ( 18 Apr 2024 06:04 )  Alb: 3.7 g/dL / Pro: 6.0 g/dL / ALK PHOS: 81 U/L / ALT: 186 U/L / AST: 129 U/L / GGT: x             Lipase: >3000 U/L (04-17-24 @ 18:00)      Urinalysis Basic - ( 18 Apr 2024 06:04 )    Color: x / Appearance: x / SG: x / pH: x  Gluc: 96 mg/dL / Ketone: x  / Bili: x / Urobili: x   Blood: x / Protein: x / Nitrite: x   Leuk Esterase: x / RBC: x / WBC x   Sq Epi: x / Non Sq Epi: x / Bacteria: x        IMAGING: I personally reviewed the CTAP and I agree with the radiologist's interpretation as described below: acute pancreatitis   normal bile ducts on US

## 2024-04-18 NOTE — SBIRT NOTE ADULT - NSSBIRTDRGPOSREINDET_GEN_A_CORE
Patient denies history of substance use and denies current substance use. SW offered substance use resources, patient declined.

## 2024-04-18 NOTE — CONSULT NOTE ADULT - ASSESSMENT
ASSESSMENT: 33yo F presenting with gallstone pancreatitis.     PLAN:  - recommend conservative management of pancreatitis  - medicine, GI eval  - NPO, IVF  - abdominal exams  - cholecystectomy on this admission  - surgery to follow    Pt seen and plan discussed with attending, Dr. Hernandez  
34 year old female who presents with acute gallstone pancreatitis    Gallstone Pancreatitis   Her t bili is normal and her bile ducts are normal caliber she likely passed her stone  Continue pain medications as needed  Continue IVF today, monitor volume status  Advance diet as tolerated (not yet ready)  Surgery evaluation appreciated cholecystectomy prior to discharge once acute pancreatitis resolved

## 2024-04-18 NOTE — H&P ADULT - HISTORY OF PRESENT ILLNESS
35 y/o female with PMH of gastritis and abdominoplasty came to the ED complaining of abdominal pain. Pain started yesterday morning, located in the epigastric area, sharp, radiating to the back, associated with nausea. She took Tums, Pepto thinking it was gastritis but no relief. She has no fever, chills, vomiting, chest pain, cough, shortness of breath, change in bowel/urinary habit, recent travel.

## 2024-04-19 LAB
ALBUMIN SERPL ELPH-MCNC: 3.6 G/DL — SIGNIFICANT CHANGE UP (ref 3.3–5.2)
ALP SERPL-CCNC: 70 U/L — SIGNIFICANT CHANGE UP (ref 40–120)
ALT FLD-CCNC: 121 U/L — HIGH
AMYLASE P1 CFR SERPL: 417 U/L — HIGH (ref 36–128)
ANION GAP SERPL CALC-SCNC: 11 MMOL/L — SIGNIFICANT CHANGE UP (ref 5–17)
AST SERPL-CCNC: 51 U/L — HIGH
BILIRUB DIRECT SERPL-MCNC: 0.1 MG/DL — SIGNIFICANT CHANGE UP (ref 0–0.3)
BILIRUB INDIRECT FLD-MCNC: 0.3 MG/DL — SIGNIFICANT CHANGE UP (ref 0.2–1)
BILIRUB SERPL-MCNC: 0.4 MG/DL — SIGNIFICANT CHANGE UP (ref 0.4–2)
BUN SERPL-MCNC: 9.4 MG/DL — SIGNIFICANT CHANGE UP (ref 8–20)
CALCIUM SERPL-MCNC: 8.5 MG/DL — SIGNIFICANT CHANGE UP (ref 8.4–10.5)
CHLORIDE SERPL-SCNC: 104 MMOL/L — SIGNIFICANT CHANGE UP (ref 96–108)
CO2 SERPL-SCNC: 26 MMOL/L — SIGNIFICANT CHANGE UP (ref 22–29)
CREAT SERPL-MCNC: 0.67 MG/DL — SIGNIFICANT CHANGE UP (ref 0.5–1.3)
EGFR: 119 ML/MIN/1.73M2 — SIGNIFICANT CHANGE UP
GLUCOSE SERPL-MCNC: 82 MG/DL — SIGNIFICANT CHANGE UP (ref 70–99)
HCT VFR BLD CALC: 30.7 % — LOW (ref 34.5–45)
HGB BLD-MCNC: 10.5 G/DL — LOW (ref 11.5–15.5)
LIDOCAIN IGE QN: 919 U/L — HIGH (ref 22–51)
MCHC RBC-ENTMCNC: 31.3 PG — SIGNIFICANT CHANGE UP (ref 27–34)
MCHC RBC-ENTMCNC: 34.2 GM/DL — SIGNIFICANT CHANGE UP (ref 32–36)
MCV RBC AUTO: 91.6 FL — SIGNIFICANT CHANGE UP (ref 80–100)
PLATELET # BLD AUTO: 222 K/UL — SIGNIFICANT CHANGE UP (ref 150–400)
POTASSIUM SERPL-MCNC: 4 MMOL/L — SIGNIFICANT CHANGE UP (ref 3.5–5.3)
POTASSIUM SERPL-SCNC: 4 MMOL/L — SIGNIFICANT CHANGE UP (ref 3.5–5.3)
PROT SERPL-MCNC: 5.9 G/DL — LOW (ref 6.6–8.7)
RBC # BLD: 3.35 M/UL — LOW (ref 3.8–5.2)
RBC # FLD: 12.4 % — SIGNIFICANT CHANGE UP (ref 10.3–14.5)
SODIUM SERPL-SCNC: 141 MMOL/L — SIGNIFICANT CHANGE UP (ref 135–145)
WBC # BLD: 9 K/UL — SIGNIFICANT CHANGE UP (ref 3.8–10.5)
WBC # FLD AUTO: 9 K/UL — SIGNIFICANT CHANGE UP (ref 3.8–10.5)

## 2024-04-19 PROCEDURE — 99232 SBSQ HOSP IP/OBS MODERATE 35: CPT

## 2024-04-19 PROCEDURE — 99231 SBSQ HOSP IP/OBS SF/LOW 25: CPT

## 2024-04-19 RX ORDER — PANTOPRAZOLE SODIUM 20 MG/1
40 TABLET, DELAYED RELEASE ORAL
Refills: 0 | Status: DISCONTINUED | OUTPATIENT
Start: 2024-04-19 | End: 2024-04-20

## 2024-04-19 RX ORDER — POLYETHYLENE GLYCOL 3350 17 G/17G
17 POWDER, FOR SOLUTION ORAL DAILY
Refills: 0 | Status: DISCONTINUED | OUTPATIENT
Start: 2024-04-19 | End: 2024-04-23

## 2024-04-19 RX ORDER — SODIUM CHLORIDE 9 MG/ML
1000 INJECTION, SOLUTION INTRAVENOUS
Refills: 0 | Status: DISCONTINUED | OUTPATIENT
Start: 2024-04-19 | End: 2024-04-23

## 2024-04-19 RX ADMIN — SODIUM CHLORIDE 125 MILLILITER(S): 9 INJECTION, SOLUTION INTRAVENOUS at 08:21

## 2024-04-19 RX ADMIN — PANTOPRAZOLE SODIUM 40 MILLIGRAM(S): 20 TABLET, DELAYED RELEASE ORAL at 17:46

## 2024-04-19 RX ADMIN — SODIUM CHLORIDE 125 MILLILITER(S): 9 INJECTION, SOLUTION INTRAVENOUS at 17:46

## 2024-04-19 RX ADMIN — Medication 650 MILLIGRAM(S): at 20:20

## 2024-04-19 RX ADMIN — Medication 650 MILLIGRAM(S): at 18:52

## 2024-04-20 ENCOUNTER — TRANSCRIPTION ENCOUNTER (OUTPATIENT)
Age: 34
End: 2024-04-20

## 2024-04-20 LAB
ALBUMIN SERPL ELPH-MCNC: 3.5 G/DL — SIGNIFICANT CHANGE UP (ref 3.3–5.2)
ALP SERPL-CCNC: 64 U/L — SIGNIFICANT CHANGE UP (ref 40–120)
ALT FLD-CCNC: 85 U/L — HIGH
ANION GAP SERPL CALC-SCNC: 11 MMOL/L — SIGNIFICANT CHANGE UP (ref 5–17)
AST SERPL-CCNC: 23 U/L — SIGNIFICANT CHANGE UP
BASOPHILS # BLD AUTO: 0.04 K/UL — SIGNIFICANT CHANGE UP (ref 0–0.2)
BASOPHILS NFR BLD AUTO: 0.5 % — SIGNIFICANT CHANGE UP (ref 0–2)
BILIRUB SERPL-MCNC: <0.2 MG/DL — LOW (ref 0.4–2)
BUN SERPL-MCNC: 11.1 MG/DL — SIGNIFICANT CHANGE UP (ref 8–20)
CALCIUM SERPL-MCNC: 8.5 MG/DL — SIGNIFICANT CHANGE UP (ref 8.4–10.5)
CHLORIDE SERPL-SCNC: 104 MMOL/L — SIGNIFICANT CHANGE UP (ref 96–108)
CO2 SERPL-SCNC: 25 MMOL/L — SIGNIFICANT CHANGE UP (ref 22–29)
CREAT SERPL-MCNC: 0.63 MG/DL — SIGNIFICANT CHANGE UP (ref 0.5–1.3)
EGFR: 121 ML/MIN/1.73M2 — SIGNIFICANT CHANGE UP
EOSINOPHIL # BLD AUTO: 0.62 K/UL — HIGH (ref 0–0.5)
EOSINOPHIL NFR BLD AUTO: 7.6 % — HIGH (ref 0–6)
GLUCOSE SERPL-MCNC: 98 MG/DL — SIGNIFICANT CHANGE UP (ref 70–99)
HCT VFR BLD CALC: 30 % — LOW (ref 34.5–45)
HGB BLD-MCNC: 10.3 G/DL — LOW (ref 11.5–15.5)
IMM GRANULOCYTES NFR BLD AUTO: 0.2 % — SIGNIFICANT CHANGE UP (ref 0–0.9)
LIDOCAIN IGE QN: 261 U/L — HIGH (ref 22–51)
LYMPHOCYTES # BLD AUTO: 2.4 K/UL — SIGNIFICANT CHANGE UP (ref 1–3.3)
LYMPHOCYTES # BLD AUTO: 29.3 % — SIGNIFICANT CHANGE UP (ref 13–44)
MAGNESIUM SERPL-MCNC: 1.7 MG/DL — SIGNIFICANT CHANGE UP (ref 1.6–2.6)
MCHC RBC-ENTMCNC: 31.4 PG — SIGNIFICANT CHANGE UP (ref 27–34)
MCHC RBC-ENTMCNC: 34.3 GM/DL — SIGNIFICANT CHANGE UP (ref 32–36)
MCV RBC AUTO: 91.5 FL — SIGNIFICANT CHANGE UP (ref 80–100)
MONOCYTES # BLD AUTO: 0.37 K/UL — SIGNIFICANT CHANGE UP (ref 0–0.9)
MONOCYTES NFR BLD AUTO: 4.5 % — SIGNIFICANT CHANGE UP (ref 2–14)
NEUTROPHILS # BLD AUTO: 4.73 K/UL — SIGNIFICANT CHANGE UP (ref 1.8–7.4)
NEUTROPHILS NFR BLD AUTO: 57.9 % — SIGNIFICANT CHANGE UP (ref 43–77)
PLATELET # BLD AUTO: 226 K/UL — SIGNIFICANT CHANGE UP (ref 150–400)
POTASSIUM SERPL-MCNC: 3.6 MMOL/L — SIGNIFICANT CHANGE UP (ref 3.5–5.3)
POTASSIUM SERPL-SCNC: 3.6 MMOL/L — SIGNIFICANT CHANGE UP (ref 3.5–5.3)
PROT SERPL-MCNC: 5.9 G/DL — LOW (ref 6.6–8.7)
RBC # BLD: 3.28 M/UL — LOW (ref 3.8–5.2)
RBC # FLD: 12.3 % — SIGNIFICANT CHANGE UP (ref 10.3–14.5)
SODIUM SERPL-SCNC: 140 MMOL/L — SIGNIFICANT CHANGE UP (ref 135–145)
WBC # BLD: 8.18 K/UL — SIGNIFICANT CHANGE UP (ref 3.8–10.5)
WBC # FLD AUTO: 8.18 K/UL — SIGNIFICANT CHANGE UP (ref 3.8–10.5)

## 2024-04-20 PROCEDURE — 99231 SBSQ HOSP IP/OBS SF/LOW 25: CPT

## 2024-04-20 PROCEDURE — 99232 SBSQ HOSP IP/OBS MODERATE 35: CPT

## 2024-04-20 RX ORDER — PANTOPRAZOLE SODIUM 20 MG/1
40 TABLET, DELAYED RELEASE ORAL DAILY
Refills: 0 | Status: DISCONTINUED | OUTPATIENT
Start: 2024-04-21 | End: 2024-04-23

## 2024-04-20 RX ORDER — POTASSIUM CHLORIDE 20 MEQ
40 PACKET (EA) ORAL ONCE
Refills: 0 | Status: COMPLETED | OUTPATIENT
Start: 2024-04-20 | End: 2024-04-20

## 2024-04-20 RX ORDER — MAGNESIUM OXIDE 400 MG ORAL TABLET 241.3 MG
400 TABLET ORAL
Refills: 0 | Status: COMPLETED | OUTPATIENT
Start: 2024-04-20 | End: 2024-04-22

## 2024-04-20 RX ADMIN — MAGNESIUM OXIDE 400 MG ORAL TABLET 400 MILLIGRAM(S): 241.3 TABLET ORAL at 17:38

## 2024-04-20 RX ADMIN — Medication 650 MILLIGRAM(S): at 21:20

## 2024-04-20 RX ADMIN — Medication 650 MILLIGRAM(S): at 22:20

## 2024-04-20 RX ADMIN — MAGNESIUM OXIDE 400 MG ORAL TABLET 400 MILLIGRAM(S): 241.3 TABLET ORAL at 12:41

## 2024-04-20 RX ADMIN — Medication 3 MILLIGRAM(S): at 21:21

## 2024-04-20 RX ADMIN — PANTOPRAZOLE SODIUM 40 MILLIGRAM(S): 20 TABLET, DELAYED RELEASE ORAL at 05:45

## 2024-04-20 RX ADMIN — MAGNESIUM OXIDE 400 MG ORAL TABLET 400 MILLIGRAM(S): 241.3 TABLET ORAL at 10:07

## 2024-04-20 RX ADMIN — Medication 650 MILLIGRAM(S): at 10:07

## 2024-04-20 RX ADMIN — Medication 650 MILLIGRAM(S): at 10:37

## 2024-04-20 RX ADMIN — Medication 40 MILLIEQUIVALENT(S): at 10:07

## 2024-04-20 RX ADMIN — SODIUM CHLORIDE 125 MILLILITER(S): 9 INJECTION, SOLUTION INTRAVENOUS at 04:09

## 2024-04-20 RX ADMIN — SODIUM CHLORIDE 125 MILLILITER(S): 9 INJECTION, SOLUTION INTRAVENOUS at 12:40

## 2024-04-20 RX ADMIN — SODIUM CHLORIDE 125 MILLILITER(S): 9 INJECTION, SOLUTION INTRAVENOUS at 21:22

## 2024-04-20 NOTE — PROGRESS NOTE ADULT - NS ATTEND AMEND GEN_ALL_CORE FT
34-year-old female with pmhx of abdominoplasty with gallstone pancreatitis and cholelithiasis   - Pain control  - Advance diet as tolerated   - Plan for RA vs Lap cholecystectomy Monday. NPO Sunday night. Preop labs
Above assessment noted.  The patient was seen and examined by myself with the surgical PA. The patient is without fevers overnight. She states she is overall feeling better, the pain has improved and is only mild at this time.  Abdomen is soft with mild mid epigastric tenderness, no guarding, no rebound.  The patient is for robotic, possible open anne on Monday.  Patient has requested Dr. Joss Glasgow to perform the procedure.
I evaluated this pt. with my ACP and agree with the above assessment and management plan. Pt. w/o c/o abdominal pain this AM and has no tenderness on exam. For cholecystectomy Monday. Continue current low fat diet until then. Signing off. Reconsult as needed. Thank you.

## 2024-04-21 LAB
ALBUMIN SERPL ELPH-MCNC: 3.6 G/DL — SIGNIFICANT CHANGE UP (ref 3.3–5.2)
ALP SERPL-CCNC: 67 U/L — SIGNIFICANT CHANGE UP (ref 40–120)
ALT FLD-CCNC: 72 U/L — HIGH
ANION GAP SERPL CALC-SCNC: 11 MMOL/L — SIGNIFICANT CHANGE UP (ref 5–17)
AST SERPL-CCNC: 18 U/L — SIGNIFICANT CHANGE UP
BILIRUB SERPL-MCNC: <0.2 MG/DL — LOW (ref 0.4–2)
BUN SERPL-MCNC: 14 MG/DL — SIGNIFICANT CHANGE UP (ref 8–20)
CALCIUM SERPL-MCNC: 9 MG/DL — SIGNIFICANT CHANGE UP (ref 8.4–10.5)
CHLORIDE SERPL-SCNC: 101 MMOL/L — SIGNIFICANT CHANGE UP (ref 96–108)
CO2 SERPL-SCNC: 27 MMOL/L — SIGNIFICANT CHANGE UP (ref 22–29)
CREAT SERPL-MCNC: 0.66 MG/DL — SIGNIFICANT CHANGE UP (ref 0.5–1.3)
EGFR: 119 ML/MIN/1.73M2 — SIGNIFICANT CHANGE UP
GLUCOSE SERPL-MCNC: 90 MG/DL — SIGNIFICANT CHANGE UP (ref 70–99)
LIDOCAIN IGE QN: 161 U/L — HIGH (ref 22–51)
POTASSIUM SERPL-MCNC: 3.9 MMOL/L — SIGNIFICANT CHANGE UP (ref 3.5–5.3)
POTASSIUM SERPL-SCNC: 3.9 MMOL/L — SIGNIFICANT CHANGE UP (ref 3.5–5.3)
PROT SERPL-MCNC: 6.3 G/DL — LOW (ref 6.6–8.7)
SODIUM SERPL-SCNC: 139 MMOL/L — SIGNIFICANT CHANGE UP (ref 135–145)

## 2024-04-21 PROCEDURE — 99232 SBSQ HOSP IP/OBS MODERATE 35: CPT

## 2024-04-21 RX ORDER — ENOXAPARIN SODIUM 100 MG/ML
40 INJECTION SUBCUTANEOUS EVERY 24 HOURS
Refills: 0 | Status: DISCONTINUED | OUTPATIENT
Start: 2024-04-21 | End: 2024-04-23

## 2024-04-21 RX ADMIN — MAGNESIUM OXIDE 400 MG ORAL TABLET 400 MILLIGRAM(S): 241.3 TABLET ORAL at 08:52

## 2024-04-21 RX ADMIN — Medication 650 MILLIGRAM(S): at 13:28

## 2024-04-21 RX ADMIN — PANTOPRAZOLE SODIUM 40 MILLIGRAM(S): 20 TABLET, DELAYED RELEASE ORAL at 05:03

## 2024-04-21 RX ADMIN — POLYETHYLENE GLYCOL 3350 17 GRAM(S): 17 POWDER, FOR SOLUTION ORAL at 12:11

## 2024-04-21 RX ADMIN — SODIUM CHLORIDE 125 MILLILITER(S): 9 INJECTION, SOLUTION INTRAVENOUS at 05:03

## 2024-04-21 RX ADMIN — Medication 650 MILLIGRAM(S): at 12:11

## 2024-04-21 RX ADMIN — Medication 650 MILLIGRAM(S): at 20:51

## 2024-04-21 RX ADMIN — SODIUM CHLORIDE 125 MILLILITER(S): 9 INJECTION, SOLUTION INTRAVENOUS at 19:50

## 2024-04-21 RX ADMIN — Medication 650 MILLIGRAM(S): at 19:51

## 2024-04-21 RX ADMIN — MAGNESIUM OXIDE 400 MG ORAL TABLET 400 MILLIGRAM(S): 241.3 TABLET ORAL at 17:48

## 2024-04-21 RX ADMIN — MAGNESIUM OXIDE 400 MG ORAL TABLET 400 MILLIGRAM(S): 241.3 TABLET ORAL at 12:11

## 2024-04-22 ENCOUNTER — TRANSCRIPTION ENCOUNTER (OUTPATIENT)
Age: 34
End: 2024-04-22

## 2024-04-22 LAB
ANION GAP SERPL CALC-SCNC: 11 MMOL/L — SIGNIFICANT CHANGE UP (ref 5–17)
BUN SERPL-MCNC: 14.9 MG/DL — SIGNIFICANT CHANGE UP (ref 8–20)
CALCIUM SERPL-MCNC: 9.2 MG/DL — SIGNIFICANT CHANGE UP (ref 8.4–10.5)
CHLORIDE SERPL-SCNC: 102 MMOL/L — SIGNIFICANT CHANGE UP (ref 96–108)
CO2 SERPL-SCNC: 28 MMOL/L — SIGNIFICANT CHANGE UP (ref 22–29)
CREAT SERPL-MCNC: 0.72 MG/DL — SIGNIFICANT CHANGE UP (ref 0.5–1.3)
EGFR: 114 ML/MIN/1.73M2 — SIGNIFICANT CHANGE UP
GLUCOSE SERPL-MCNC: 91 MG/DL — SIGNIFICANT CHANGE UP (ref 70–99)
HCT VFR BLD CALC: 33.3 % — LOW (ref 34.5–45)
HGB BLD-MCNC: 11.6 G/DL — SIGNIFICANT CHANGE UP (ref 11.5–15.5)
MAGNESIUM SERPL-MCNC: 1.9 MG/DL — SIGNIFICANT CHANGE UP (ref 1.6–2.6)
MCHC RBC-ENTMCNC: 31.7 PG — SIGNIFICANT CHANGE UP (ref 27–34)
MCHC RBC-ENTMCNC: 34.8 GM/DL — SIGNIFICANT CHANGE UP (ref 32–36)
MCV RBC AUTO: 91 FL — SIGNIFICANT CHANGE UP (ref 80–100)
PHOSPHATE SERPL-MCNC: 4.6 MG/DL — SIGNIFICANT CHANGE UP (ref 2.4–4.7)
PLATELET # BLD AUTO: 283 K/UL — SIGNIFICANT CHANGE UP (ref 150–400)
POTASSIUM SERPL-MCNC: 4.3 MMOL/L — SIGNIFICANT CHANGE UP (ref 3.5–5.3)
POTASSIUM SERPL-SCNC: 4.3 MMOL/L — SIGNIFICANT CHANGE UP (ref 3.5–5.3)
RBC # BLD: 3.66 M/UL — LOW (ref 3.8–5.2)
RBC # FLD: 12.1 % — SIGNIFICANT CHANGE UP (ref 10.3–14.5)
SODIUM SERPL-SCNC: 141 MMOL/L — SIGNIFICANT CHANGE UP (ref 135–145)
WBC # BLD: 7.73 K/UL — SIGNIFICANT CHANGE UP (ref 3.8–10.5)
WBC # FLD AUTO: 7.73 K/UL — SIGNIFICANT CHANGE UP (ref 3.8–10.5)

## 2024-04-22 PROCEDURE — 99232 SBSQ HOSP IP/OBS MODERATE 35: CPT

## 2024-04-22 RX ORDER — METOCLOPRAMIDE HCL 10 MG
10 TABLET ORAL ONCE
Refills: 0 | Status: COMPLETED | OUTPATIENT
Start: 2024-04-22 | End: 2024-04-22

## 2024-04-22 RX ADMIN — SODIUM CHLORIDE 125 MILLILITER(S): 9 INJECTION, SOLUTION INTRAVENOUS at 04:49

## 2024-04-22 RX ADMIN — MAGNESIUM OXIDE 400 MG ORAL TABLET 400 MILLIGRAM(S): 241.3 TABLET ORAL at 05:16

## 2024-04-22 RX ADMIN — PANTOPRAZOLE SODIUM 40 MILLIGRAM(S): 20 TABLET, DELAYED RELEASE ORAL at 05:15

## 2024-04-22 RX ADMIN — SODIUM CHLORIDE 75 MILLILITER(S): 9 INJECTION, SOLUTION INTRAVENOUS at 06:31

## 2024-04-22 RX ADMIN — Medication 650 MILLIGRAM(S): at 17:37

## 2024-04-22 RX ADMIN — POLYETHYLENE GLYCOL 3350 17 GRAM(S): 17 POWDER, FOR SOLUTION ORAL at 13:04

## 2024-04-22 RX ADMIN — Medication 10 MILLIGRAM(S): at 06:33

## 2024-04-22 NOTE — DISCHARGE NOTE PROVIDER - CARE PROVIDER_API CALL
Joss Glasgow  Surgery  [address] [place] [id number]  Phone: [phone]  Fax: [phone]  Follow Up Time: 2 weeks

## 2024-04-22 NOTE — DISCHARGE NOTE PROVIDER - NSDCADMDATE_GEN_ALL_CORE_FT
Pt is a difficult stick. RN attempted x 2 to obtain IV and labs. Was able to get labs on 2nd attempt, but line did not hold. Blanca Hughes will attempt an IV start with US machine. 18-Apr-2024 01:59

## 2024-04-22 NOTE — DISCHARGE NOTE PROVIDER - HOSPITAL COURSE
Admission HPI: Patient is a 35 y/o female with PMH of gastritis and abdominoplasty came to the ED complaining of abdominal pain. Pain started 4/17 morning, located in the epigastric area, sharp, radiating to the back, associated with nausea. She took Tums, Pepto thinking it was gastritis but no relief. She has no fever, chills, vomiting, chest pain, cough, shortness of breath, change in bowel/urinary habit, recent travel.    (18 Apr 2024 05:44)    HOSPITAL COURSE   RUQ Ultrasound done on 4/18 demonstrated gallstones with no wall thickening or pericholecystic fluid. Patient was admitted for care of pancreatitis and cholecystectomy on admission when pancreatitis resolved. From 4/18-4/22, patient's pancreatitis improved. Labs were down trending and patient's pain and nausea had improved. She remained afebrile and hemodynamically stable. On 4/23, patient was taken to the operating room and underwent a Robot-assisted Laparoscopic Cholecystectomy. Patient tolerated the procedure well, extubated in the operating room and the transferred to PACU in stable condition. Once hemodynamically stable and effective pain control, patient was able to ambulate with assistance and later on transferred back to the floor for post-operative monitoring. Patient denies nausea, vomiting, is tolerating PO intake and voiding as expected. She was subsequently cleared for discharge home. Patient will follow up with Dr Glasgow. All appropiate prescriptions obtained at [facility] prior to discharge. Written discharge instruction explained and given to patient.    Admission HPI: Patient is a 35 y/o female with PMH of gastritis and abdominoplasty came to the ED complaining of abdominal pain. Pain started 4/17 morning, located in the epigastric area, sharp, radiating to the back, associated with nausea. She took Tums, Pepto thinking it was gastritis but no relief. She has no fever, chills, vomiting, chest pain, cough, shortness of breath, change in bowel/urinary habit, recent travel.    (18 Apr 2024 05:44)    HOSPITAL COURSE   RUQ Ultrasound done on 4/18 demonstrated gallstones with no wall thickening or pericholecystic fluid. Patient was admitted for care of pancreatitis and cholecystectomy on admission when pancreatitis resolved. From 4/18-4/22, patient's pancreatitis improved. Labs were down trending and patient's pain and nausea had improved. She remained afebrile and hemodynamically stable. On 4/23, patient was taken to the operating room and underwent a Robot-assisted Laparoscopic Subtotal Cholecystectomy. Patient tolerated the procedure well, extubated in the operating room and the transferred to PACU in stable condition. Once hemodynamically stable and effective pain control, patient was able to ambulate with assistance and later on transferred back to the floor for post-operative monitoring. Patient denies nausea, vomiting, is tolerating PO intake and voiding as expected. She was subsequently cleared for discharge home. Patient will follow up with Dr Glasgow. All appropriate prescriptions sent to her pharmacy prior to discharge. Written discharge instruction explained and given to patient.

## 2024-04-22 NOTE — DISCHARGE NOTE PROVIDER - NSDCCPCAREPLAN_GEN_ALL_CORE_FT
PRINCIPAL DISCHARGE DIAGNOSIS  Diagnosis: Gallstone pancreatitis  Assessment and Plan of Treatment: BATHING: Please do not submerge wound underwater. You may shower starting post op day #2. . Leave steri strips in place. They may fall off on their own. OR if you have dermabond (purple looking skin glue) do not scrub or pick. It will come off on its on. You may pat it dry after showering.  ACTIVITY: No heavy lifting or straining. Otherwise, you may return to your usual level of physical activity. You should ambulate every 4 hours while awake. It is important for your recovery process and for prevention of blood clots.   DIET: You may return back to your regular diet.   MEDS: You may return back to your home meds, if any. As well as, take medications as prescribed when obtained from [facility].   RETURN TO THE EMERGENCY DEPARTMENT for any of the following - worsening pain, fever/chills, nausea/vomiting, altered mental status, chest pain, shortness of breath, or any other new / worsening symptom. to your usual diet.  NOTIFY YOUR SURGEON IF: You have any bleeding that does not stop, any pus draining from your wound(s), any fever (over 100.4 F) or chills, persistent nausea/vomiting, persistent diarrhea, or if your pain is not controlled on your discharge medications.  FOLLOW-UP: Please follow up with your primary care physician within 3-4weeks after surgery and Dr Glasgow 1-2 weeks.

## 2024-04-23 ENCOUNTER — TRANSCRIPTION ENCOUNTER (OUTPATIENT)
Age: 34
End: 2024-04-23

## 2024-04-23 ENCOUNTER — RESULT REVIEW (OUTPATIENT)
Age: 34
End: 2024-04-23

## 2024-04-23 VITALS
TEMPERATURE: 99 F | RESPIRATION RATE: 18 BRPM | DIASTOLIC BLOOD PRESSURE: 67 MMHG | HEART RATE: 89 BPM | OXYGEN SATURATION: 95 % | SYSTOLIC BLOOD PRESSURE: 103 MMHG

## 2024-04-23 LAB
ANION GAP SERPL CALC-SCNC: 8 MMOL/L — SIGNIFICANT CHANGE UP (ref 5–17)
BLD GP AB SCN SERPL QL: SIGNIFICANT CHANGE UP
BUN SERPL-MCNC: 14.7 MG/DL — SIGNIFICANT CHANGE UP (ref 8–20)
CALCIUM SERPL-MCNC: 9.1 MG/DL — SIGNIFICANT CHANGE UP (ref 8.4–10.5)
CHLORIDE SERPL-SCNC: 100 MMOL/L — SIGNIFICANT CHANGE UP (ref 96–108)
CO2 SERPL-SCNC: 29 MMOL/L — SIGNIFICANT CHANGE UP (ref 22–29)
CREAT SERPL-MCNC: 0.78 MG/DL — SIGNIFICANT CHANGE UP (ref 0.5–1.3)
EGFR: 103 ML/MIN/1.73M2 — SIGNIFICANT CHANGE UP
GLUCOSE SERPL-MCNC: 85 MG/DL — SIGNIFICANT CHANGE UP (ref 70–99)
HCT VFR BLD CALC: 36.9 % — SIGNIFICANT CHANGE UP (ref 34.5–45)
HGB BLD-MCNC: 12.6 G/DL — SIGNIFICANT CHANGE UP (ref 11.5–15.5)
MAGNESIUM SERPL-MCNC: 1.9 MG/DL — SIGNIFICANT CHANGE UP (ref 1.6–2.6)
MCHC RBC-ENTMCNC: 31.6 PG — SIGNIFICANT CHANGE UP (ref 27–34)
MCHC RBC-ENTMCNC: 34.1 GM/DL — SIGNIFICANT CHANGE UP (ref 32–36)
MCV RBC AUTO: 92.5 FL — SIGNIFICANT CHANGE UP (ref 80–100)
PHOSPHATE SERPL-MCNC: 4.3 MG/DL — SIGNIFICANT CHANGE UP (ref 2.4–4.7)
PLATELET # BLD AUTO: 288 K/UL — SIGNIFICANT CHANGE UP (ref 150–400)
POTASSIUM SERPL-MCNC: 4.2 MMOL/L — SIGNIFICANT CHANGE UP (ref 3.5–5.3)
POTASSIUM SERPL-SCNC: 4.2 MMOL/L — SIGNIFICANT CHANGE UP (ref 3.5–5.3)
RBC # BLD: 3.99 M/UL — SIGNIFICANT CHANGE UP (ref 3.8–5.2)
RBC # FLD: 12.3 % — SIGNIFICANT CHANGE UP (ref 10.3–14.5)
SODIUM SERPL-SCNC: 137 MMOL/L — SIGNIFICANT CHANGE UP (ref 135–145)
WBC # BLD: 9.01 K/UL — SIGNIFICANT CHANGE UP (ref 3.8–10.5)
WBC # FLD AUTO: 9.01 K/UL — SIGNIFICANT CHANGE UP (ref 3.8–10.5)

## 2024-04-23 PROCEDURE — 80053 COMPREHEN METABOLIC PANEL: CPT

## 2024-04-23 PROCEDURE — 88304 TISSUE EXAM BY PATHOLOGIST: CPT

## 2024-04-23 PROCEDURE — 86900 BLOOD TYPING SEROLOGIC ABO: CPT

## 2024-04-23 PROCEDURE — 82248 BILIRUBIN DIRECT: CPT

## 2024-04-23 PROCEDURE — 96375 TX/PRO/DX INJ NEW DRUG ADDON: CPT

## 2024-04-23 PROCEDURE — 84702 CHORIONIC GONADOTROPIN TEST: CPT

## 2024-04-23 PROCEDURE — 86901 BLOOD TYPING SEROLOGIC RH(D): CPT

## 2024-04-23 PROCEDURE — C9399: CPT

## 2024-04-23 PROCEDURE — 86850 RBC ANTIBODY SCREEN: CPT

## 2024-04-23 PROCEDURE — 83735 ASSAY OF MAGNESIUM: CPT

## 2024-04-23 PROCEDURE — 84478 ASSAY OF TRIGLYCERIDES: CPT

## 2024-04-23 PROCEDURE — 84100 ASSAY OF PHOSPHORUS: CPT

## 2024-04-23 PROCEDURE — 85025 COMPLETE CBC W/AUTO DIFF WBC: CPT

## 2024-04-23 PROCEDURE — 74177 CT ABD & PELVIS W/CONTRAST: CPT | Mod: MC

## 2024-04-23 PROCEDURE — 80048 BASIC METABOLIC PNL TOTAL CA: CPT

## 2024-04-23 PROCEDURE — C1889: CPT

## 2024-04-23 PROCEDURE — S2900 ROBOTIC SURGICAL SYSTEM: CPT | Mod: NC

## 2024-04-23 PROCEDURE — 85027 COMPLETE CBC AUTOMATED: CPT

## 2024-04-23 PROCEDURE — S2900: CPT

## 2024-04-23 PROCEDURE — 36415 COLL VENOUS BLD VENIPUNCTURE: CPT

## 2024-04-23 PROCEDURE — 76705 ECHO EXAM OF ABDOMEN: CPT

## 2024-04-23 PROCEDURE — 83690 ASSAY OF LIPASE: CPT

## 2024-04-23 PROCEDURE — 47562 LAPAROSCOPIC CHOLECYSTECTOMY: CPT | Mod: 22

## 2024-04-23 PROCEDURE — 88304 TISSUE EXAM BY PATHOLOGIST: CPT | Mod: 26

## 2024-04-23 PROCEDURE — 47562 LAPAROSCOPIC CHOLECYSTECTOMY: CPT | Mod: AS,22

## 2024-04-23 PROCEDURE — 96374 THER/PROPH/DIAG INJ IV PUSH: CPT

## 2024-04-23 PROCEDURE — 82150 ASSAY OF AMYLASE: CPT

## 2024-04-23 PROCEDURE — 80074 ACUTE HEPATITIS PANEL: CPT

## 2024-04-23 PROCEDURE — 99285 EMERGENCY DEPT VISIT HI MDM: CPT | Mod: 25

## 2024-04-23 DEVICE — LIGATING CLIPS WECK HEMOLOK POLYMER LARGE (PURPLE) 6: Type: IMPLANTABLE DEVICE | Status: FUNCTIONAL

## 2024-04-23 DEVICE — XI STAPLER SUREFORM RELOAD 45 BLUE: Type: IMPLANTABLE DEVICE | Status: FUNCTIONAL

## 2024-04-23 RX ORDER — ONDANSETRON 8 MG/1
4 TABLET, FILM COATED ORAL EVERY 8 HOURS
Refills: 0 | Status: DISCONTINUED | OUTPATIENT
Start: 2024-04-23 | End: 2024-04-23

## 2024-04-23 RX ORDER — LANOLIN ALCOHOL/MO/W.PET/CERES
3 CREAM (GRAM) TOPICAL AT BEDTIME
Refills: 0 | Status: DISCONTINUED | OUTPATIENT
Start: 2024-04-23 | End: 2024-04-23

## 2024-04-23 RX ORDER — ACETAMINOPHEN 500 MG
3 TABLET ORAL
Qty: 0 | Refills: 0 | DISCHARGE
Start: 2024-04-23

## 2024-04-23 RX ORDER — FENTANYL CITRATE 50 UG/ML
50 INJECTION INTRAVENOUS
Refills: 0 | Status: DISCONTINUED | OUTPATIENT
Start: 2024-04-23 | End: 2024-04-23

## 2024-04-23 RX ORDER — ACETAMINOPHEN 500 MG
975 TABLET ORAL EVERY 8 HOURS
Refills: 0 | Status: DISCONTINUED | OUTPATIENT
Start: 2024-04-23 | End: 2024-04-23

## 2024-04-23 RX ORDER — PANTOPRAZOLE SODIUM 20 MG/1
40 TABLET, DELAYED RELEASE ORAL DAILY
Refills: 0 | Status: DISCONTINUED | OUTPATIENT
Start: 2024-04-23 | End: 2024-04-23

## 2024-04-23 RX ORDER — ONDANSETRON 8 MG/1
4 TABLET, FILM COATED ORAL ONCE
Refills: 0 | Status: DISCONTINUED | OUTPATIENT
Start: 2024-04-23 | End: 2024-04-23

## 2024-04-23 RX ORDER — ENOXAPARIN SODIUM 100 MG/ML
40 INJECTION SUBCUTANEOUS EVERY 24 HOURS
Refills: 0 | Status: DISCONTINUED | OUTPATIENT
Start: 2024-04-23 | End: 2024-04-23

## 2024-04-23 RX ORDER — POLYETHYLENE GLYCOL 3350 17 G/17G
17 POWDER, FOR SOLUTION ORAL DAILY
Refills: 0 | Status: DISCONTINUED | OUTPATIENT
Start: 2024-04-23 | End: 2024-04-23

## 2024-04-23 RX ORDER — OXYCODONE HYDROCHLORIDE 5 MG/1
2.5 TABLET ORAL EVERY 4 HOURS
Refills: 0 | Status: DISCONTINUED | OUTPATIENT
Start: 2024-04-23 | End: 2024-04-23

## 2024-04-23 RX ORDER — OXYCODONE HYDROCHLORIDE 5 MG/1
5 TABLET ORAL EVERY 4 HOURS
Refills: 0 | Status: DISCONTINUED | OUTPATIENT
Start: 2024-04-23 | End: 2024-04-23

## 2024-04-23 RX ORDER — SODIUM CHLORIDE 9 MG/ML
1000 INJECTION, SOLUTION INTRAVENOUS
Refills: 0 | Status: DISCONTINUED | OUTPATIENT
Start: 2024-04-23 | End: 2024-04-23

## 2024-04-23 RX ADMIN — PANTOPRAZOLE SODIUM 40 MILLIGRAM(S): 20 TABLET, DELAYED RELEASE ORAL at 12:53

## 2024-04-23 RX ADMIN — SODIUM CHLORIDE 75 MILLILITER(S): 9 INJECTION, SOLUTION INTRAVENOUS at 11:33

## 2024-04-23 RX ADMIN — PANTOPRAZOLE SODIUM 40 MILLIGRAM(S): 20 TABLET, DELAYED RELEASE ORAL at 05:39

## 2024-04-23 RX ADMIN — POLYETHYLENE GLYCOL 3350 17 GRAM(S): 17 POWDER, FOR SOLUTION ORAL at 12:52

## 2024-04-23 RX ADMIN — SODIUM CHLORIDE 75 MILLILITER(S): 9 INJECTION, SOLUTION INTRAVENOUS at 05:39

## 2024-04-23 RX ADMIN — Medication 975 MILLIGRAM(S): at 12:57

## 2024-04-23 RX ADMIN — OXYCODONE HYDROCHLORIDE 5 MILLIGRAM(S): 5 TABLET ORAL at 15:38

## 2024-04-23 RX ADMIN — OXYCODONE HYDROCHLORIDE 5 MILLIGRAM(S): 5 TABLET ORAL at 11:53

## 2024-04-23 RX ADMIN — OXYCODONE HYDROCHLORIDE 5 MILLIGRAM(S): 5 TABLET ORAL at 11:33

## 2024-04-23 NOTE — BRIEF OPERATIVE NOTE - NSICDXBRIEFPROCEDURE_GEN_ALL_CORE_FT
PROCEDURES:  Robot-assisted cholecystectomy 23-Apr-2024 10:39:22  Magda Duncan  Robot-assisted laparoscopic partial cholecystectomy 23-Apr-2024 10:39:39  Magda Duncan

## 2024-04-23 NOTE — PROGRESS NOTE ADULT - SUBJECTIVE AND OBJECTIVE BOX
Acute pancreatitis without infection or necrosis    HPI:  33 y/o female with PMH of gastritis and abdominoplasty came to the ED complaining of abdominal pain. Pain started yesterday morning, located in the epigastric area, sharp, radiating to the back, associated with nausea. She took Tums, Pepto thinking it was gastritis but no relief. She has no fever, chills, vomiting, chest pain, cough, shortness of breath, change in bowel/urinary habit, recent travel. (18 Apr 2024 05:44)    Interval History:  Patient was seen and examined at bedside around 9:30 am.  Feels much better.   Has some discomfort in midabdomen.   Tolerating PO.  No nausea or vomiting.     ROS:  As per interval history otherwise unremarkable.    PHYSICAL EXAM:  Vital Signs   T(C): 36.9 (20 Apr 2024 08:26), Max: 36.9 (20 Apr 2024 08:26)  T(F): 98.4 (20 Apr 2024 08:26), Max: 98.4 (20 Apr 2024 08:26)  HR: 66 (20 Apr 2024 08:26) (63 - 75)  BP: 95/53 (20 Apr 2024 08:26) (95/53 - 111/68)  RR: 16 (20 Apr 2024 08:26) (16 - 18)  SpO2: 98% (20 Apr 2024 08:26) (97% - 98%)  Parameters below as of 20 Apr 2024 08:26  Patient On (Oxygen Delivery Method): room air  General: Young female lying in bed comfortably. No acute distress  HEENT: EOMI. Clear conjunctivae. Moist mucus membrane  Neck: Supple.   Chest: Good air entry. No wheezing, rales or rhonchi.   Heart: Normal S1 & S2. RRR.   Abdomen: Non distended. Soft. Non-tender but mild discomfort in midabdomen. + BS  Ext: No pedal edema. No calf tenderness   Neuro: Awake and alert. No focal deficit. Speech clear.   Skin: Warm and Dry  Psychiatry: Normal mood and affect    I&O's Summary    19 Apr 2024 07:01  -  20 Apr 2024 07:00  --------------------------------------------------------  IN: 375 mL / OUT: 0 mL / NET: 375 mL    20 Apr 2024 07:01  -  20 Apr 2024 15:10  --------------------------------------------------------  IN: 1000 mL / OUT: 0 mL / NET: 1000 mL    LABS:                        10.3   8.18  )-----------( 226      ( 20 Apr 2024 06:06 )             30.0     04-20    140  |  104  |  11.1  ----------------------------<  98  3.6   |  25.0  |  0.63    Ca    8.5      20 Apr 2024 06:06  Mg     1.7     04-20    TPro  5.9<L>  /  Alb  3.5  /  TBili  <0.2<L>  /  DBili  x   /  AST  23  /  ALT  85<H>  /  AlkPhos  64  04-20    RADIOLOGY & ADDITIONAL STUDIES:  Reviewed     MEDICATIONS  (STANDING):  lactated ringers. 1000 milliLiter(s) (125 mL/Hr) IV Continuous <Continuous>  magnesium oxide 400 milliGRAM(s) Oral three times a day with meals  polyethylene glycol 3350 17 Gram(s) Oral daily    MEDICATIONS  (PRN):  acetaminophen     Tablet .. 650 milliGRAM(s) Oral every 6 hours PRN Temp greater or equal to 38C (100.4F), Mild Pain (1 - 3)  aluminum hydroxide/magnesium hydroxide/simethicone Suspension 30 milliLiter(s) Oral every 4 hours PRN Dyspepsia  melatonin 3 milliGRAM(s) Oral at bedtime PRN Insomnia  morphine  - Injectable 2 milliGRAM(s) IV Push every 4 hours PRN Severe Pain (7 - 10)  ondansetron Injectable 4 milliGRAM(s) IV Push every 8 hours PRN Nausea and/or Vomiting        
SUBJECTIVE / 24H EVENTS: Patient seen and examined at bedside. She reports abdominal pain is improving. Tolerated regular diet today without nausea or vomiting. Denies fever or chills.     MEDICATIONS  (STANDING):  lactated ringers. 1000 milliLiter(s) (125 mL/Hr) IV Continuous <Continuous>  pantoprazole  Injectable 40 milliGRAM(s) IV Push two times a day  polyethylene glycol 3350 17 Gram(s) Oral daily    MEDICATIONS  (PRN):  acetaminophen     Tablet .. 650 milliGRAM(s) Oral every 6 hours PRN Temp greater or equal to 38C (100.4F), Mild Pain (1 - 3)  aluminum hydroxide/magnesium hydroxide/simethicone Suspension 30 milliLiter(s) Oral every 4 hours PRN Dyspepsia  melatonin 3 milliGRAM(s) Oral at bedtime PRN Insomnia  morphine  - Injectable 2 milliGRAM(s) IV Push every 4 hours PRN Severe Pain (7 - 10)  ondansetron Injectable 4 milliGRAM(s) IV Push every 8 hours PRN Nausea and/or Vomiting      Vital Signs Last 24 Hrs  T(C): 36.6 (19 Apr 2024 12:26), Max: 37 (18 Apr 2024 16:13)  T(F): 97.8 (19 Apr 2024 12:26), Max: 98.6 (18 Apr 2024 16:13)  HR: 84 (19 Apr 2024 12:26) (58 - 84)  BP: 104/65 (19 Apr 2024 12:26) (97/53 - 110/73)  BP(mean): 77 (18 Apr 2024 18:24) (77 - 77)  RR: 18 (19 Apr 2024 12:26) (18 - 18)  SpO2: 98% (19 Apr 2024 12:26) (95% - 99%)    Parameters below as of 19 Apr 2024 12:26  Patient On (Oxygen Delivery Method): room air        Constitutional: patient appears comfortable resting in bed, in no apparent distress  Respiratory: respirations are unlabored, no accessory muscle use, no conversational dyspnea  Cardiovascular: regular rate & rhythm  Gastrointestinal: abdomen is soft & non-distended, mild R sided / epigastric tenderness to palpation w/ no rebound tenderness / guarding  Neurological: A&O x 3  Skin: mucous membranes moist, no diaphoresis, pallor, cyanosis or jaundice      I&O's Detail    19 Apr 2024 07:01  -  19 Apr 2024 13:22  --------------------------------------------------------  IN:    Lactated Ringers: 125 mL  Total IN: 125 mL    OUT:  Total OUT: 0 mL    Total NET: 125 mL          LABS:                        10.5   9.00  )-----------( 222      ( 19 Apr 2024 04:40 )             30.7     04-19    141  |  104  |  9.4  ----------------------------<  82  4.0   |  26.0  |  0.67    Ca    8.5      19 Apr 2024 04:40    TPro  5.9<L>  /  Alb  3.6  /  TBili  0.4  /  DBili  0.1  /  AST  51<H>  /  ALT  121<H>  /  AlkPhos  70  04-19      Urinalysis Basic - ( 19 Apr 2024 04:40 )    Color: x / Appearance: x / SG: x / pH: x  Gluc: 82 mg/dL / Ketone: x  / Bili: x / Urobili: x   Blood: x / Protein: x / Nitrite: x   Leuk Esterase: x / RBC: x / WBC x   Sq Epi: x / Non Sq Epi: x / Bacteria: x      
Subjective: Patient seen at bedside, no current complaints, no overnight events, denies n/v/cp/sob. Tolerating diet, urinating freely, ambulating independently      MEDICATIONS  (STANDING):  lactated ringers. 1000 milliLiter(s) (125 mL/Hr) IV Continuous <Continuous>  pantoprazole  Injectable 40 milliGRAM(s) IV Push two times a day  polyethylene glycol 3350 17 Gram(s) Oral daily    MEDICATIONS  (PRN):  acetaminophen     Tablet .. 650 milliGRAM(s) Oral every 6 hours PRN Temp greater or equal to 38C (100.4F), Mild Pain (1 - 3)  aluminum hydroxide/magnesium hydroxide/simethicone Suspension 30 milliLiter(s) Oral every 4 hours PRN Dyspepsia  melatonin 3 milliGRAM(s) Oral at bedtime PRN Insomnia  morphine  - Injectable 2 milliGRAM(s) IV Push every 4 hours PRN Severe Pain (7 - 10)  ondansetron Injectable 4 milliGRAM(s) IV Push every 8 hours PRN Nausea and/or Vomiting      Vital Signs Last 24 Hrs  T(C): 36.7 (19 Apr 2024 20:00), Max: 36.8 (19 Apr 2024 11:27)  T(F): 98 (19 Apr 2024 20:00), Max: 98.2 (19 Apr 2024 11:27)  HR: 75 (19 Apr 2024 20:00) (58 - 84)  BP: 111/68 (19 Apr 2024 20:00) (97/53 - 111/68)  BP(mean): --  RR: 18 (19 Apr 2024 20:00) (18 - 18)  SpO2: 98% (19 Apr 2024 20:00) (97% - 99%)    Parameters below as of 19 Apr 2024 20:00  Patient On (Oxygen Delivery Method): room air        Physical Exam:    Constitutional: NAD  HEENT: PERRL, EOMI  Respiratory: Respirations non-labored, no accessory muscle use  Gastrointestinal: Soft, mild epigastric tenderness, non-distended  Neurological: A&O x 3      LABS:                        10.5   9.00  )-----------( 222      ( 19 Apr 2024 04:40 )             30.7     04-19    141  |  104  |  9.4  ----------------------------<  82  4.0   |  26.0  |  0.67    Ca    8.5      19 Apr 2024 04:40    TPro  5.9<L>  /  Alb  3.6  /  TBili  0.4  /  DBili  0.1  /  AST  51<H>  /  ALT  121<H>  /  AlkPhos  70  04-19      Urinalysis Basic - ( 19 Apr 2024 04:40 )    Color: x / Appearance: x / SG: x / pH: x  Gluc: 82 mg/dL / Ketone: x  / Bili: x / Urobili: x   Blood: x / Protein: x / Nitrite: x   Leuk Esterase: x / RBC: x / WBC x   Sq Epi: x / Non Sq Epi: x / Bacteria: x  
Subjective:  Pt states that her abdominal pain is improved since admission. Tolerating diet. Denies abdominal pain, chest pain, fever/chills, shortness of breath, nausea, vomiting, diarrhea, headache.       MEDICATIONS  (STANDING):  enoxaparin Injectable 40 milliGRAM(s) SubCutaneous every 24 hours  lactated ringers. 1000 milliLiter(s) (75 mL/Hr) IV Continuous <Continuous>  pantoprazole    Tablet 40 milliGRAM(s) Oral daily  polyethylene glycol 3350 17 Gram(s) Oral daily    MEDICATIONS  (PRN):  acetaminophen     Tablet .. 650 milliGRAM(s) Oral every 6 hours PRN Temp greater or equal to 38C (100.4F), Mild Pain (1 - 3)  aluminum hydroxide/magnesium hydroxide/simethicone Suspension 30 milliLiter(s) Oral every 4 hours PRN Dyspepsia  melatonin 3 milliGRAM(s) Oral at bedtime PRN Insomnia  morphine  - Injectable 2 milliGRAM(s) IV Push every 4 hours PRN Severe Pain (7 - 10)  ondansetron Injectable 4 milliGRAM(s) IV Push every 8 hours PRN Nausea and/or Vomiting      Vital Signs Last 24 Hrs  T(C): 36.3 (22 Apr 2024 05:00), Max: 36.7 (21 Apr 2024 16:50)  T(F): 97.3 (22 Apr 2024 05:00), Max: 98 (21 Apr 2024 16:50)  HR: 75 (22 Apr 2024 05:00) (70 - 81)  BP: 108/71 (22 Apr 2024 05:00) (108/71 - 125/83)  BP(mean): --  RR: 18 (22 Apr 2024 05:00) (18 - 18)  SpO2: 97% (22 Apr 2024 05:00) (96% - 97%)    Parameters below as of 22 Apr 2024 05:00  Patient On (Oxygen Delivery Method): room air        Physical Exam:    Constitutional: NAD  HEENT: PERRL, EOMI  Neck: No JVD, FROM without pain  Respiratory: Respirations non-labored, no accessory muscle use  Gastrointestinal: Soft, non-tender, non-distended  Extremities: No peripheral edema, No cyanosis  Neurological: A&O x 3; without gross deficit  Musculoskeletal: No joint pain, swelling, deformity, or point tenderness; no limitation of movement      LABS:                        11.6   7.73  )-----------( 283      ( 22 Apr 2024 05:50 )             33.3     Pending        A: 34F admitted with gallstone pancreatitis, clinically well    Plan:   -Plan for RA cholecystectomy 4/23  -NPO p MN  -DVT ppx  -OOB  -IS  
Subjective: Patient seen at bedside, no current complaints, no overnight events, denies n/v/cp/sob. NPO for OR this AM       MEDICATIONS  (STANDING):  enoxaparin Injectable 40 milliGRAM(s) SubCutaneous every 24 hours  lactated ringers. 1000 milliLiter(s) (75 mL/Hr) IV Continuous <Continuous>  pantoprazole    Tablet 40 milliGRAM(s) Oral daily  polyethylene glycol 3350 17 Gram(s) Oral daily    MEDICATIONS  (PRN):  acetaminophen     Tablet .. 650 milliGRAM(s) Oral every 6 hours PRN Temp greater or equal to 38C (100.4F), Mild Pain (1 - 3)  aluminum hydroxide/magnesium hydroxide/simethicone Suspension 30 milliLiter(s) Oral every 4 hours PRN Dyspepsia  melatonin 3 milliGRAM(s) Oral at bedtime PRN Insomnia  morphine  - Injectable 2 milliGRAM(s) IV Push every 4 hours PRN Severe Pain (7 - 10)  ondansetron Injectable 4 milliGRAM(s) IV Push every 8 hours PRN Nausea and/or Vomiting      Vital Signs Last 24 Hrs  T(C): 36.6 (23 Apr 2024 06:11), Max: 36.7 (22 Apr 2024 16:55)  T(F): 97.9 (23 Apr 2024 06:11), Max: 98.1 (22 Apr 2024 16:55)  HR: 72 (23 Apr 2024 06:11) (63 - 81)  BP: 121/88 (23 Apr 2024 06:11) (97/59 - 129/65)  BP(mean): --  RR: 16 (23 Apr 2024 06:11) (16 - 18)  SpO2: 100% (23 Apr 2024 06:11) (96% - 100%)    Parameters below as of 23 Apr 2024 06:11  Patient On (Oxygen Delivery Method): room air        Physical Exam:    Constitutional: NAD  HEENT: PERRL, EOMI  Respiratory: Respirations non-labored, no accessory muscle use  Gastrointestinal: Soft, non-tender, non-distended  Neurological: A&O x 3      LABS:                        11.6   7.73  )-----------( 283      ( 22 Apr 2024 05:50 )             33.3     04-22    141  |  102  |  14.9  ----------------------------<  91  4.3   |  28.0  |  0.72    Ca    9.2      22 Apr 2024 05:50  Phos  4.6     04-22  Mg     1.9     04-22        Urinalysis Basic - ( 22 Apr 2024 05:50 )    Color: x / Appearance: x / SG: x / pH: x  Gluc: 91 mg/dL / Ketone: x  / Bili: x / Urobili: x   Blood: x / Protein: x / Nitrite: x   Leuk Esterase: x / RBC: x / WBC x   Sq Epi: x / Non Sq Epi: x / Bacteria: x      
        Chief Complaint:  Patient is a 34y old  Female who presents with a chief complaint of abdominal pain (18 Apr 2024 08:44)      HPI/ 24 hr events: Patient seen and examined at bedside. She is still having some abdominal discomfort but has improved since admission. She is able to tolerate clear liquid diet. Had mild nausea without vomiting. No BM for 4-5 days. Vitals are overall stable, resolved leukocytosis, LFTs are improving, HCT 30.7.       REVIEW OF SYSTEMS:   General: Negative  HEENT: Negative  CV: Negative  Respiratory: Negative  GI: See HPI  : Negative  MSK: Negative  Hematologic: Negative  Skin: Negative    MEDICATIONS:   MEDICATIONS  (STANDING):  lactated ringers. 1000 milliLiter(s) (125 mL/Hr) IV Continuous <Continuous>    MEDICATIONS  (PRN):  acetaminophen     Tablet .. 650 milliGRAM(s) Oral every 6 hours PRN Temp greater or equal to 38C (100.4F), Mild Pain (1 - 3)  aluminum hydroxide/magnesium hydroxide/simethicone Suspension 30 milliLiter(s) Oral every 4 hours PRN Dyspepsia  melatonin 3 milliGRAM(s) Oral at bedtime PRN Insomnia  morphine  - Injectable 2 milliGRAM(s) IV Push every 4 hours PRN Severe Pain (7 - 10)  ondansetron Injectable 4 milliGRAM(s) IV Push every 8 hours PRN Nausea and/or Vomiting            DIET:  Diet, Regular:   Low Fat (LOWFAT) (04-19-24 @ 08:42) [Active]          ALLERGIES:   Allergies    No Known Allergies    Intolerances        VITAL SIGNS:   Vital Signs Last 24 Hrs  T(C): 36.6 (19 Apr 2024 08:21), Max: 37 (18 Apr 2024 16:13)  T(F): 97.8 (19 Apr 2024 08:21), Max: 98.6 (18 Apr 2024 16:13)  HR: 58 (19 Apr 2024 08:21) (58 - 75)  BP: 97/53 (19 Apr 2024 08:21) (97/53 - 110/73)  BP(mean): 77 (18 Apr 2024 18:24) (77 - 77)  RR: 18 (19 Apr 2024 08:21) (18 - 18)  SpO2: 98% (19 Apr 2024 08:21) (95% - 99%)    Parameters below as of 19 Apr 2024 08:21  Patient On (Oxygen Delivery Method): room air      I&O's Summary      PHYSICAL EXAM:   GENERAL:  No acute distress  HEENT:  NC/AT, conjunctiva clear, sclera anicteric  CHEST:  No increased effort  HEART:  Regular rate  ABDOMEN:  Soft, mild epigastric ttp, non-distended, normoactive bowel sounds, no rebound or guarding  EXTREMITIES: No edema  SKIN:  Warm, dry  NEURO:  Calm, cooperative    LABS:                        10.5   9.00  )-----------( 222      ( 19 Apr 2024 04:40 )             30.7     Hemoglobin: 10.5 g/dL (04-19-24 @ 04:40)  Hemoglobin: 12.9 g/dL (04-17-24 @ 18:00)    04-19    141  |  104  |  9.4  ----------------------------<  82  4.0   |  26.0  |  0.67    Ca    8.5      19 Apr 2024 04:40    TPro  5.9<L>  /  Alb  3.6  /  TBili  0.4  /  DBili  0.1  /  AST  51<H>  /  ALT  121<H>  /  AlkPhos  70  04-19    LIVER FUNCTIONS - ( 19 Apr 2024 04:40 )  Alb: 3.6 g/dL / Pro: 5.9 g/dL / ALK PHOS: 70 U/L / ALT: 121 U/L / AST: 51 U/L / GGT: x             Lipase: 919 U/L (04-19-24 @ 04:40)  Amylase: 417 U/L (04-19-24 @ 04:40)                      Triglycerides, Serum: 34 mg/dL (04-17-24 @ 23:18)              RADIOLOGY & ADDITIONAL STUDIES:      ACC: [id number] EXAM:  CT ABDOMEN AND PELVIS IC   ORDERED BY: CHIP WARE     PROCEDURE DATE:  04/17/2024          INTERPRETATION:  CLINICAL INFORMATION: Abdominal pain. Evaluate for   pancreatitis.    COMPARISON: None.    CONTRAST/COMPLICATIONS:  IV Contrast: Omnipaque 350  90 cc administered  Oral Contrast: NONE  Complications: None reported at time of study completion    PROCEDURE:  CT of the Abdomen and Pelvis was performed.  Sagittal and coronal reformats were performed.    FINDINGS:  LOWER CHEST: Mild bibasilar dependent atelectasis. No pleural effusion.    LIVER: Within normal limits.  BILE DUCTS: Normal caliber.  GALLBLADDER: Cholelithiasis.  SPLEEN: Within normal limits.  PANCREAS: Trace peripancreatic edema/stranding at the level of the   body/tail, likely in keeping with enteritis. Correlate with clinical   parameters. No peripancreatic abscess.  ADRENALS: Within normal limits.  KIDNEYS/URETERS: Within normal limits.    BLADDER: Within normal limits.  REPRODUCTIVE ORGANS: Intrauterine device in situ. Right adnexal cyst   measuring up to 2.5 cm.    BOWEL: No bowel obstruction. Normal appendix.  PERITONEUM: No ascites.  VESSELS: Within normal limits.  RETROPERITONEUM/LYMPH NODES: No lymphadenopathy.  ABDOMINAL WALL: Cutaneous umbilical ring identified.  BONES: Within normal limits.    IMPRESSION:    Acute interstitial pancreatitis. No peripancreatic abscess.    Cholelithiasis.    --- End of Report ---            FRANTZ ALMANZA MD; Attending Radiologist  This document has been electronically signed. Apr 17 2024 10:24PM  04-17-24 @ 22:13    -- -- --   ACC: [id number] EXAM:  US ABDOMEN RT UPR QUADRANT   ORDERED BY: CHIP WARE     PROCEDURE DATE:  04/17/2024          INTERPRETATION:  CLINICAL INFORMATION: Quadrant pain    COMPARISON: None available.    TECHNIQUE: Sonography of the right upper quadrant.    FINDINGS:  Liver: Within normal limits.  Bile ducts: Normal caliber. Common bile duct measures 4 mm.  Gallbladder: Multiple small gallstones. No gallbladder wall thickening.   No pericholecystic fluid. No sonographic Daniel sign.  Pancreas: Visualized portions are within normal limits.  Right kidney: 10.2 cm. No hydronephrosis.  Ascites: None.  IVC: Visualized portions are within normal limits.    IMPRESSION:    Multiple small gallstones. No gallbladder wall thickening. No   pericholecystic fluid. No sonographic Daniel sign..        --- End of Report ---            RICARDO DOAN MD; Attending Radiologist  This document has been electronically signed. Apr 17 2024  8:49PM    
Acute pancreatitis without infection or necrosis    HPI:  35 y/o female with PMH of gastritis and abdominoplasty came to the ED complaining of abdominal pain. Pain started yesterday morning, located in the epigastric area, sharp, radiating to the back, associated with nausea. She took Tums, Pepto thinking it was gastritis but no relief. She has no fever, chills, vomiting, chest pain, cough, shortness of breath, change in bowel/urinary habit, recent travel. (18 Apr 2024 05:44)    Interval History:  Patient was seen and examined at bedside around 10:45 am.  Pain is slowly improving.  Tolerating PO.  No nausea or vomiting.   + BM.     ROS:  As per interval history otherwise unremarkable.    PHYSICAL EXAM:  Vital Signs  T(C): 36.6 (19 Apr 2024 12:26), Max: 36.9 (18 Apr 2024 18:24)  T(F): 97.8 (19 Apr 2024 12:26), Max: 98.4 (18 Apr 2024 18:24)  HR: 84 (19 Apr 2024 12:26) (58 - 84)  BP: 104/65 (19 Apr 2024 12:26) (97/53 - 110/73)  BP(mean): 77 (18 Apr 2024 18:24) (77 - 77)  RR: 18 (19 Apr 2024 12:26) (18 - 18)  SpO2: 98% (19 Apr 2024 12:26) (95% - 99%)  Parameters below as of 19 Apr 2024 12:26  Patient On (Oxygen Delivery Method): room air  General: Young female sitting in bed comfortably. No acute distress  HEENT: EOMI. Clear conjunctivae. Moist mucus membrane  Neck: Supple.   Chest: Good air entry. No wheezing, rales or rhonchi.   Heart: Normal S1 & S2. RRR.   Abdomen: Non distended. Soft. Non-tender but guarding in midabdomen. + BS  Ext: No pedal edema. No calf tenderness   Neuro: Awake and alert. No focal deficit. Speech clear.   Skin: Warm and Dry  Psychiatry: Normal mood and affect    I&O's Summary    19 Apr 2024 07:01  -  19 Apr 2024 17:05  --------------------------------------------------------  IN: 375 mL / OUT: 0 mL / NET: 375 mL    LABS:                        10.5   9.00  )-----------( 222      ( 19 Apr 2024 04:40 )             30.7     04-19    141  |  104  |  9.4  ----------------------------<  82  4.0   |  26.0  |  0.67    Ca    8.5      19 Apr 2024 04:40    TPro  5.9<L>  /  Alb  3.6  /  TBili  0.4  /  DBili  0.1  /  AST  51<H>  /  ALT  121<H>  /  AlkPhos  70  04-19      Urinalysis Basic - ( 19 Apr 2024 04:40 )    Color: x / Appearance: x / SG: x / pH: x  Gluc: 82 mg/dL / Ketone: x  / Bili: x / Urobili: x   Blood: x / Protein: x / Nitrite: x   Leuk Esterase: x / RBC: x / WBC x   Sq Epi: x / Non Sq Epi: x / Bacteria: x    RADIOLOGY & ADDITIONAL STUDIES:  Reviewed     MEDICATIONS  (STANDING):  lactated ringers. 1000 milliLiter(s) (125 mL/Hr) IV Continuous <Continuous>  pantoprazole  Injectable 40 milliGRAM(s) IV Push two times a day  polyethylene glycol 3350 17 Gram(s) Oral daily    MEDICATIONS  (PRN):  acetaminophen     Tablet .. 650 milliGRAM(s) Oral every 6 hours PRN Temp greater or equal to 38C (100.4F), Mild Pain (1 - 3)  aluminum hydroxide/magnesium hydroxide/simethicone Suspension 30 milliLiter(s) Oral every 4 hours PRN Dyspepsia  melatonin 3 milliGRAM(s) Oral at bedtime PRN Insomnia  morphine  - Injectable 2 milliGRAM(s) IV Push every 4 hours PRN Severe Pain (7 - 10)  ondansetron Injectable 4 milliGRAM(s) IV Push every 8 hours PRN Nausea and/or Vomiting      
CINTHIA REYES Patient is a 34y old  Female who presents with a chief complaint of abdominal pain (18 Apr 2024 08:44)     HPI:  33 y/o female with PMH of gastritis and abdominoplasty came to the ED complaining of abdominal pain. Pain started yesterday morning, located in the epigastric area, sharp, radiating to the back, associated with nausea. She took Tums, Pepto thinking it was gastritis but no relief. She has no fever, chills, vomiting, chest pain, cough, shortness of breath, change in bowel/urinary habit, recent travel.    (18 Apr 2024 05:44)    The patient was seen and evaluated young awake alert walking to the bathroom and back mother at bedside - states feels better and surgery discussion to wait till pancreas areless inflammed   The patient is in no acute distress.  Denied any fever chest pain, palpitations, shortness of breath,  fever, dysuria, cough, edema       I&O's Summary    Allergies    No Known Allergies    Intolerances      HEALTH ISSUES - PROBLEM Dx:        PAST MEDICAL & SURGICAL HISTORY:  Gastritis              Vital Signs Last 24 Hrs  T(C): 36.9 (18 Apr 2024 18:24), Max: 37 (18 Apr 2024 16:13)  T(F): 98.4 (18 Apr 2024 18:24), Max: 98.6 (18 Apr 2024 16:13)  HR: 65 (18 Apr 2024 18:24) (59 - 75)  BP: 103/64 (18 Apr 2024 18:24) (96/85 - 104/70)  BP(mean): 77 (18 Apr 2024 18:24) (77 - 77)  RR: 18 (18 Apr 2024 18:24) (18 - 19)  SpO2: 96% (18 Apr 2024 18:24) (96% - 98%)    Parameters below as of 18 Apr 2024 18:24  Patient On (Oxygen Delivery Method): room air    T(C): 36.9 (04-18-24 @ 18:24), Max: 37 (04-18-24 @ 16:13)  HR: 65 (04-18-24 @ 18:24) (59 - 75)  BP: 103/64 (04-18-24 @ 18:24) (96/85 - 104/70)  RR: 18 (04-18-24 @ 18:24) (18 - 19)  SpO2: 96% (04-18-24 @ 18:24) (96% - 98%)  Wt(kg): --    PHYSICAL EXAM:    GENERAL: NAD  HEAD:  Atraumatic, Normocephalic  EYES: EOMI, PERRL, conjunctiva and sclera clear  ENMT:  Moist mucous membranes,  No lesions  NECK: Supple, No JVD, Normal thyroid  NERVOUS SYSTEM:  Alert & Oriented X3,  Moves upper and lower extremities; CNS-II-XII  CHEST/LUNG: Clear to auscultation bilaterally; No rales, rhonchi, wheezing,   HEART: Regular rate and rhythm; No murmurs,   ABDOMEN: Soft, tender, Nondistended; Bowel sounds present  EXTREMITIES:  Peripheral Pulses, No  cyanosis, or edema  psychiatry- mood and affect appropriate, Insight and judgement intact     acetaminophen     Tablet .. 650 milliGRAM(s) Oral every 6 hours PRN  aluminum hydroxide/magnesium hydroxide/simethicone Suspension 30 milliLiter(s) Oral every 4 hours PRN  lactated ringers. 1000 milliLiter(s) IV Continuous <Continuous>  melatonin 3 milliGRAM(s) Oral at bedtime PRN  morphine  - Injectable 2 milliGRAM(s) IV Push every 4 hours PRN  ondansetron Injectable 4 milliGRAM(s) IV Push every 8 hours PRN      LABS:                          12.9   20.98 )-----------( 314      ( 17 Apr 2024 18:00 )             37.2     04-18    140  |  106  |  13.3  ----------------------------<  96  3.8   |  27.0  |  0.70    Ca    8.4      18 Apr 2024 06:04    TPro  6.0<L>  /  Alb  3.7  /  TBili  0.9  /  DBili  x   /  AST  129<H>  /  ALT  186<H>  /  AlkPhos  81  04-18    LIVER FUNCTIONS - ( 18 Apr 2024 06:04 )  Alb: 3.7 g/dL / Pro: 6.0 g/dL / ALK PHOS: 81 U/L / ALT: 186 U/L / AST: 129 U/L / GGT: x                 Urinalysis Basic - ( 18 Apr 2024 06:04 )    Color: x / Appearance: x / SG: x / pH: x  Gluc: 96 mg/dL / Ketone: x  / Bili: x / Urobili: x   Blood: x / Protein: x / Nitrite: x   Leuk Esterase: x / RBC: x / WBC x   Sq Epi: x / Non Sq Epi: x / Bacteria: x      CAPILLARY BLOOD GLUCOSE          RADIOLOGY & ADDITIONAL TESTS:      Consultant notes reviewed    Case discussed with consultant/provider/ family /patient 
Chief Complaint:  Patient is a 34y old  Female who presents with a chief complaint of abdominal pain (18 Apr 2024 08:44)    HPI/ 24 hr events: Patient seen and examined at bedside. Patient reports feeling better this morning with only mild abdominal pain. Tolerating diet. Vitals are overall stable, no leukocytosis, afebrile overnight.    REVIEW OF SYSTEMS:   General: Negative  HEENT: Negative  CV: Negative  Respiratory: Negative  GI: See HPI  : Negative  MSK: Negative  Hematologic: Negative  Skin: Negative    MEDICATIONS:   MEDICATIONS  (STANDING):  lactated ringers. 1000 milliLiter(s) (125 mL/Hr) IV Continuous <Continuous>  magnesium oxide 400 milliGRAM(s) Oral three times a day with meals  polyethylene glycol 3350 17 Gram(s) Oral daily    MEDICATIONS  (PRN):  acetaminophen     Tablet .. 650 milliGRAM(s) Oral every 6 hours PRN Temp greater or equal to 38C (100.4F), Mild Pain (1 - 3)  aluminum hydroxide/magnesium hydroxide/simethicone Suspension 30 milliLiter(s) Oral every 4 hours PRN Dyspepsia  melatonin 3 milliGRAM(s) Oral at bedtime PRN Insomnia  morphine  - Injectable 2 milliGRAM(s) IV Push every 4 hours PRN Severe Pain (7 - 10)  ondansetron Injectable 4 milliGRAM(s) IV Push every 8 hours PRN Nausea and/or Vomiting        DIET:  Diet, Regular:   Low Fat (LOWFAT) (04-19-24 @ 08:42) [Active]      ALLERGIES:   Allergies    No Known Allergies    Intolerances        VITAL SIGNS:   Vital Signs Last 24 Hrs  T(C): 36.9 (20 Apr 2024 08:26), Max: 36.9 (20 Apr 2024 08:26)  T(F): 98.4 (20 Apr 2024 08:26), Max: 98.4 (20 Apr 2024 08:26)  HR: 66 (20 Apr 2024 08:26) (63 - 84)  BP: 95/53 (20 Apr 2024 08:26) (95/53 - 111/68)  BP(mean): --  RR: 16 (20 Apr 2024 08:26) (16 - 18)  SpO2: 98% (20 Apr 2024 08:26) (97% - 98%)    Parameters below as of 20 Apr 2024 08:26  Patient On (Oxygen Delivery Method): room air      I&O's Summary    19 Apr 2024 07:01  -  20 Apr 2024 07:00  --------------------------------------------------------  IN: 375 mL / OUT: 0 mL / NET: 375 mL    20 Apr 2024 07:01  -  20 Apr 2024 10:31  --------------------------------------------------------  IN: 250 mL / OUT: 0 mL / NET: 250 mL        PHYSICAL EXAM:   GENERAL:  No acute distress  HEENT:  NC/AT, conjunctiva clear, sclera anicteric  CHEST:  No increased effort  HEART:  Regular rate  ABDOMEN:  Soft, + TTP, non-distended, normoactive bowel sounds, no rebound or guarding  EXTREMITIES: No edema  SKIN:  Warm, dry  NEURO:  Calm, cooperative    LABS:                        10.3   8.18  )-----------( 226      ( 20 Apr 2024 06:06 )             30.0     Hemoglobin: 10.3 g/dL (04-20-24 @ 06:06)  Hemoglobin: 10.5 g/dL (04-19-24 @ 04:40)  Hemoglobin: 12.9 g/dL (04-17-24 @ 18:00)    04-20    140  |  104  |  11.1  ----------------------------<  98  3.6   |  25.0  |  0.63    Ca    8.5      20 Apr 2024 06:06  Mg     1.7     04-20    TPro  5.9<L>  /  Alb  3.5  /  TBili  <0.2<L>  /  DBili  x   /  AST  23  /  ALT  85<H>  /  AlkPhos  64  04-20    LIVER FUNCTIONS - ( 20 Apr 2024 06:06 )  Alb: 3.5 g/dL / Pro: 5.9 g/dL / ALK PHOS: 64 U/L / ALT: 85 U/L / AST: 23 U/L / GGT: x                 Lipase: 261 U/L (04-20-24 @ 06:06)  Amylase: 417 U/L (04-19-24 @ 04:40)      Hepatitis A IgM Antibody: Nonreact (04-17-24 @ 23:18)  Hepatitis B Core IgM Antibody: Nonreact (04-17-24 @ 23:18)  Hepatitis B Surface Antigen: Nonreact (04-17-24 @ 23:18)  Hepatitis C Virus S/CO Ratio: 0.11 S/CO (04-17-24 @ 23:18)    Triglycerides, Serum: 34 mg/dL (04-17-24 @ 23:18)    RADIOLOGY & ADDITIONAL STUDIES:      ACC: [id number] EXAM:  CT ABDOMEN AND PELVIS IC   ORDERED BY: CHIP WARE     PROCEDURE DATE:  04/17/2024          INTERPRETATION:  CLINICAL INFORMATION: Abdominal pain. Evaluate for   pancreatitis.    COMPARISON: None.    CONTRAST/COMPLICATIONS:  IV Contrast: Omnipaque 350  90 cc administered  Oral Contrast: NONE  Complications: None reported at time of study completion    PROCEDURE:  CT of the Abdomen and Pelvis was performed.  Sagittal and coronal reformats were performed.    FINDINGS:  LOWER CHEST: Mild bibasilar dependent atelectasis. No pleural effusion.    LIVER: Within normal limits.  BILE DUCTS: Normal caliber.  GALLBLADDER: Cholelithiasis.  SPLEEN: Within normal limits.  PANCREAS: Trace peripancreatic edema/stranding at the level of the   body/tail, likely in keeping with enteritis. Correlate with clinical   parameters. No peripancreatic abscess.  ADRENALS: Within normal limits.  KIDNEYS/URETERS: Within normal limits.    BLADDER: Within normal limits.  REPRODUCTIVE ORGANS: Intrauterine device in situ. Right adnexal cyst   measuring up to 2.5 cm.    BOWEL: No bowel obstruction. Normal appendix.  PERITONEUM: No ascites.  VESSELS: Within normal limits.  RETROPERITONEUM/LYMPH NODES: No lymphadenopathy.  ABDOMINAL WALL: Cutaneous umbilical ring identified.  BONES: Within normal limits.    IMPRESSION:    Acute interstitial pancreatitis. No peripancreatic abscess.    Cholelithiasis.    --- End of Report ---            FRANTZ ALMANZA MD; Attending Radiologist  This document has been electronically signed. Apr 17 2024 10:24PM  04-17-24 @ 22:13    -- -- --   ACC: [id number] EXAM:  US ABDOMEN RT UPR QUADRANT   ORDERED BY: CHIP WARE     PROCEDURE DATE:  04/17/2024          INTERPRETATION:  CLINICAL INFORMATION: Quadrant pain    COMPARISON: None available.    TECHNIQUE: Sonography of the right upper quadrant.    FINDINGS:  Liver: Within normal limits.  Bile ducts: Normal caliber. Common bile duct measures 4 mm.  Gallbladder: Multiple small gallstones. No gallbladder wall thickening.   No pericholecystic fluid. No sonographic Daniel sign.  Pancreas: Visualized portions are within normal limits.  Right kidney: 10.2 cm. No hydronephrosis.  Ascites: None.  IVC: Visualized portions are within normal limits.    IMPRESSION:    Multiple small gallstones. No gallbladder wall thickening. No   pericholecystic fluid. No sonographic Daniel sign..        --- End of Report ---            RICARDO DOAN MD; Attending Radiologist  This document has been electronically signed. Apr 17 2024  8:49PM    
Subjective: Pt evaluated at bedside this AM. Resting in NAD. NAEON. Pain has improved.      MEDICATIONS  (STANDING):  lactated ringers. 1000 milliLiter(s) (125 mL/Hr) IV Continuous <Continuous>  magnesium oxide 400 milliGRAM(s) Oral three times a day with meals  pantoprazole    Tablet 40 milliGRAM(s) Oral daily  polyethylene glycol 3350 17 Gram(s) Oral daily    MEDICATIONS  (PRN):  acetaminophen     Tablet .. 650 milliGRAM(s) Oral every 6 hours PRN Temp greater or equal to 38C (100.4F), Mild Pain (1 - 3)  aluminum hydroxide/magnesium hydroxide/simethicone Suspension 30 milliLiter(s) Oral every 4 hours PRN Dyspepsia  melatonin 3 milliGRAM(s) Oral at bedtime PRN Insomnia  morphine  - Injectable 2 milliGRAM(s) IV Push every 4 hours PRN Severe Pain (7 - 10)  ondansetron Injectable 4 milliGRAM(s) IV Push every 8 hours PRN Nausea and/or Vomiting      Vital Signs Last 24 Hrs  T(C): 36.6 (21 Apr 2024 04:33), Max: 36.9 (20 Apr 2024 08:26)  T(F): 97.8 (21 Apr 2024 04:33), Max: 98.4 (20 Apr 2024 08:26)  HR: 63 (21 Apr 2024 04:33) (63 - 73)  BP: 97/63 (21 Apr 2024 04:33) (95/53 - 116/73)  BP(mean): --  RR: 18 (21 Apr 2024 04:33) (16 - 18)  SpO2: 96% (21 Apr 2024 04:33) (94% - 98%)    Parameters below as of 21 Apr 2024 04:33  Patient On (Oxygen Delivery Method): room air        Physical Exam:    Constitutional: NAD  HEENT: EOMI  Respiratory: Respirations non-labored, no accessory muscle use  Gastrointestinal: Soft, mildly tender in RUQ, non-distended, no guarding or rebound  Extremities: No peripheral edema, No cyanosis  Neurological: A&O x 3; without gross deficit  Psych: Cooperative, appropriate affect      LABS:                        10.3   8.18  )-----------( 226      ( 20 Apr 2024 06:06 )             30.0     04-20    140  |  104  |  11.1  ----------------------------<  98  3.6   |  25.0  |  0.63    Ca    8.5      20 Apr 2024 06:06  Mg     1.7     04-20    TPro  5.9<L>  /  Alb  3.5  /  TBili  <0.2<L>  /  DBili  x   /  AST  23  /  ALT  85<H>  /  AlkPhos  64  04-20      Urinalysis Basic - ( 20 Apr 2024 06:06 )    Color: x / Appearance: x / SG: x / pH: x  Gluc: 98 mg/dL / Ketone: x  / Bili: x / Urobili: x   Blood: x / Protein: x / Nitrite: x   Leuk Esterase: x / RBC: x / WBC x   Sq Epi: x / Non Sq Epi: x / Bacteria: x

## 2024-04-23 NOTE — PROGRESS NOTE ADULT - ASSESSMENT
33 y/o female admitted for gallstone pancreatitis. Pain improving, WBC, LFTs and lipase are downtrending.    PLAN  - OR for Monday 4/22 for robotic cholecystectomy   - continue regular diet, NPO @ MN  - Preop today  - Trend WBC, LFTs  - Pain relief as needed  - Encourage OOB 
34 year old female who presents with acute gallstone pancreatitis    Gallstone Pancreatitis   bile ducts are normal caliber pain resolved - possibly passed Gall stone   pain medications PRN   IVF  and Clear liquid diet   Surgery -cholecystectomy prior to discharge once acute pancreatitis resolved  
34F with gallstone pancreatitis, doiung well, OR this AM for cholecystectomy    P:  Pain control  NPO  DVT PPx  Ambulate as tolerated  OR today
35 y/o female with PMH of gastritis and abdominoplasty came to the ED complaining of abdominal pain. Pain started yesterday morning, located in the epigastric area, sharp, radiating to the back, associated with nausea. She took Tums, Pepto thinking it was gastritis but no relief. In the ED, CT A/P demonstrating acute pancreatitis, cholelithiasis. US abdomen: multiple small gallstones. Labs; Lipase > 3000, WBC: 20.98 with left shit, AST: 400, ALT: 245    Acute gallstone pancreatitis   CT and US noted   Continue hydration   Pain control   Zofran PRN for nausea/vomiting   GI and Surgery follow ups noted  Plan for OR on Monday     Elevated LFT   GI follow up noted     Leukocytosis   Reactive  Resolved     Hx of gastritis   Patient takes OTC as needed   Continue Protonix     DVT prophylaxis: ambulate as tolerated     Dispo: Home once stable. 
This is a 33 y/o female with PMH of gastritis and abdominoplasty who presents to the ED complaining of abdominal pain. diagnosed with with acute gallstone pancreatitis.    #Gallstone Pancreatitis   T bili normal   Liver enzymes trending down  Lipase 3000--> 261  CT A/P w/ IC (04.17.24) Acute interstitial pancreatitis. No peripancreatic abscess. Cholelithiasis.  RUQ U/S (04.17.24) Multiple small gallstones. No gallbladder wall thickening. No   pericholecystic fluid. No sonographic Daniel sign.  - Patient with improvement in abdominal pain. Tolerating low fat diet   - Trend liver enzymes  - Pain management per primary team  - antiemetics as needed  - Pancreatitis should be treated to resolution   - Planned OR for Monday 4/22 for robotic cholecystectomy   - GI will sign off now. Please call us back with any questions or concern.     _________________________________________________________________  Assessment and recommendations are final when note is signed by the attending physician.   
33 y/o F who presents with abdominal pain found to have acute gallstone pancreatitis    #Gallstone Pancreatitis   CT A/P w/ IV Cont (04.17.24)- Bile ducts normal caliber. Acute interstitial pancreatitis. No peripancreatic abscess. Cholelithiasis  US Abd (04.17.24)- Multiple small gallstones. No gallbladder wall thickening. No pericholecystic fluid    - IVF, monitor volume status, HCT goal with resuscitation <40, monitor urine output  - Trend CBC, CMP, CRP, and coags daily  - Advance to low fat diet as tolerated   - Pain control as needed. Bowel regimen while on opioids to prevent constipation (Miralax added)   - PPI BID  - DVT prophylaxis, encourage ambulation as tolerated  - Pancreatitis should be followed to resolution  - Surgery evaluation appreciated cholecystectomy prior to discharge once acute pancreatitis resolved  _________________________________________________________________  Assessment and recommendations are final when note is signed by the attending physician. 
33 y/o female admitted for gallstone pancreatitis. Pain improving, WBC, LFTs and lipase are downtrending.    P;  - As long as pt's symptoms continue to improve, plan is for OR for Monday 4/22 for robotic cholecystectomy   - Can continue regular diet  - Trend WBC, LFTs  - Pain relief as needed  - Encourage frequent IS use, DVT ppx   - Keep NPO p MN on Sunday. Ok to continue chemical DVT ppx kenyon-operatively    
35 y/o female with PMH of gastritis and abdominoplasty came to the ED complaining of abdominal pain. Pain started yesterday morning, located in the epigastric area, sharp, radiating to the back, associated with nausea. She took Tums, Pepto thinking it was gastritis but no relief. In the ED, CT A/P demonstrating acute pancreatitis, cholelithiasis. US abdomen: multiple small gallstones. Labs; Lipase > 3000, WBC: 20.98 with left shit, AST: 400, ALT: 245    Acute gallstone pancreatitis   CT and US noted   Continue hydration   Pain control   Zofran PRN for nausea/vomiting   GI and Surgery follow ups noted  Plan for OR on Monday     Elevated LFT   Improving   GI follow up noted     Leukocytosis   Reactive  Resolved     Hx of gastritis   Patient takes OTC as needed   Continue Protonix     DVT prophylaxis: ambulate as tolerated     Dispo: Home once stable. 
Pt is a 35 y/o female admitted for mgmt of gallstone pancreatitis. Pain improving, WBC, LFTs and lipase are downtrending.  - As long as pt's symptoms continue to improve, plan is for OR for Monday 4/22 for robotic cholecystectomy   - Can continue regular diet  - Trend WBC, LFTs  - Pain relief as needed  - Encourage frequent IS use, DVT ppx   - Keep NPO p MN on Sunday. Ok to continue chemical DVT ppx kenyon-operatively

## 2024-04-23 NOTE — BRIEF OPERATIVE NOTE - OPERATION/FINDINGS
Abd entered using Veress and ports placed under direct visualization. intrahepatic GB noted. Retraction provided and vessel assumed to be cystic artery ligated with hemolok. unable to obtain critical view. subtotal cholecystectomy performed using sureform blue load. Taken proximally then entry into distal remanent to ensure no stones and only one lumen visualized. distal entry site stapled with additional blue load.  Gallbladder take down performed without entry into gallbladder. Gallbladder and smaller remanent portion Placed into endocatch and removed via umbilical port. Betadine to site. 12mm umbilical port site closed using 0 vicryl laparoscopically. Skin closed with 4.0 monocryl and dermabond.

## 2024-04-23 NOTE — DISCHARGE NOTE NURSING/CASE MANAGEMENT/SOCIAL WORK - PATIENT PORTAL LINK FT
You can access the FollowMyHealth Patient Portal offered by [facility] by registering at the following website: [email or web address]. By joining [facility]’s FollowMyHealth portal, you will also be able to view your health information using other applications (apps) compatible with our system.

## 2024-04-23 NOTE — PROGRESS NOTE ADULT - PROVIDER SPECIALTY LIST ADULT
Gastroenterology
Surgery
Gastroenterology
Hospitalist
Hospitalist
Surgery
Surgery
Hospitalist
Surgery
Surgery

## 2024-04-26 PROBLEM — K29.70 GASTRITIS, UNSPECIFIED, WITHOUT BLEEDING: Chronic | Status: ACTIVE | Noted: 2024-04-18

## 2024-04-26 LAB — SURGICAL PATHOLOGY STUDY: SIGNIFICANT CHANGE UP

## 2024-05-06 ENCOUNTER — APPOINTMENT (OUTPATIENT)
Dept: SURGERY | Facility: CLINIC | Age: 34
End: 2024-05-06
Payer: COMMERCIAL

## 2024-05-06 VITALS
SYSTOLIC BLOOD PRESSURE: 113 MMHG | HEART RATE: 84 BPM | DIASTOLIC BLOOD PRESSURE: 76 MMHG | TEMPERATURE: 97.2 F | RESPIRATION RATE: 14 BRPM | WEIGHT: 171 LBS | BODY MASS INDEX: 29.92 KG/M2 | OXYGEN SATURATION: 97 % | HEIGHT: 63.5 IN

## 2024-05-06 PROCEDURE — 99024 POSTOP FOLLOW-UP VISIT: CPT

## 2024-05-06 NOTE — ASSESSMENT
[FreeTextEntry1] : This is a 34-year-old female who is status post cholecystectomy. Tolerating regular diet. Patient states she feels slightly constipated, encouraged her to maintain hydration, maintain activity and can start bowel regimen if needed. Incisions healing well.

## 2024-05-06 NOTE — HISTORY OF PRESENT ILLNESS
[de-identified] :  Ms. REYES is a 34 year old woman status post cholecystectomy on 4/23/24. Today, the patient denies pain. Denies fever/chills/nausea/emesis, denies changes in bowel or bladder habits. Tolerating diet, reports normal bowel movements.   Pathology reviewed.

## 2024-05-06 NOTE — PHYSICAL EXAM
[JVD] : no jugular venous distention  [Normal Thyroid] : the thyroid was normal [No Rash or Lesion] : No rash or lesion [Alert] : alert [Oriented to Person] : oriented to person [Oriented to Place] : oriented to place [Oriented to Time] : oriented to time [Calm] : calm [de-identified] :  No acute distress [de-identified] :  No respiratory distress [de-identified] :  Regular rate [de-identified] : soft, nontender. no rebound or guarding.  incisions c/d/i [de-identified] :  normal range of motion

## 2024-12-27 NOTE — DISCHARGE NOTE PROVIDER - [email or web address]_ALL_CORE
<-- Click to add NO significant Past Surgical History This document is complete and the patient is ready for discharge.

## 2025-01-14 NOTE — ED ADULT NURSE NOTE - CAS EDN DISCHARGE ASSESSMENT
patient reports no healing wound on aislinn foot and sob Alert and oriented to person, place and time

## 2025-01-18 NOTE — ED STATDOCS - NSFOLLOWUPINSTRUCTIONS_ED_ALL_ED_FT
HPI:  Unknown age male, unknown past medical history (reported stroke and MI by coworkers) presented to [facility] with AMS, Pt was working at [employer] when he was found down by coworkers. EMS called and pt brought to [facility] ED. Intubated, sedated, started on cardene for SBPs in 200s. CT head showed brain stem bleed. Transferred to NSICU for further management.  (30 Sep 2024 12:55)    HOSPITAL COURSE:  9/30: transferred from [facility]. A line placed. Versed dc'd. Cy Rader at bedside, states pt has family in [place], cannot confirm medications or PMH other than stroke and MI. 250cc bolus 3% given. LR switched to NS. hydralazine 25q8 started, 3% started, switched propofol to precedex   10/1: stability CTH done. Added labetalol, started TF. Palliative consulted. ethics consulted to determine surrogate. febrile 103, pan cx sent  10/2: BD 2, GEORGE overnight. TF resumed. Desatt'd to 80s, FiO2 inc. to 50. Fentanyl given, ABG, CXR ordered. Maxxed on precedex, started on propofol for DARIEN -4 - -5. Precedex dc'd. Duonebs, mucomyst, hypertonic added. 3% dc'd. Cardene dc'd. Start vanc/CTX. Increased labetalol 200q8. MRSA negative, dc'd vanc. ETT pulled back 2cm x 2, good positioning after confirmatory chest xray. Ethics attempting to establish HCP with family. Na 159, starting FW 250q6 for range 150-155.   10/3: BD3, GEORGE o/n, neuro stable. Na elevating, FW increased to 300q6. Dc'd bowel reg for diarrhea. vEEG started. SQH 5000q8 tonight.   10/4: BD 4, albumn bolus, incr. LR to 80 2/2 incr. in Cr, LR to 10 0cc/hr for uptrending Cr. Started 7% hypersal for 48hrs and SL atropine for inline/oral thick secretions. Dc'd CTX and started ancef for MSSA in the sputum. Nephrology consulted for CKD, f/u recs. SBP 170s, given hydralazine 10mg IVP.   10/5: BD5, o/n 10mg IVP hydralazine given for SBP 170s and started on hydralazine 25q8 via OGT. 10mg IV push labetalol for SBP > 160s. RT placed for diarrhea.   10/6: BD6, o/n FW increased to 350q4 per nephrology recs. IV tylenol for temp 100.6, SBp 160s presumed uncomfortable.   10/7: BD7, overnight pancultured for temp 101.8F.   10/8: BD8. GEORGE. Cr bumped. decreased LR to 75cc/hr. Adding simethicone ATC. incr hydralazine 50mgTID. Incr labetalol 300mgTID. Na 145, decreased FWF to 250q6. Start precedex. FENa consistent with intrinsic kidney injury. Pend repeat renal US. Retaining up to 1.3L, bladder scans q6, straight cath PRN  10/9: BD 9. GEORGE overnight. Neuro stable. abd xray for distention w non-specific gas pattern, OGT to LIWS for morning. duonebs/mucomyst to q8 for improving secretions. Changed tube feeds to Jevity 1.5 20cc/hr, low rate due to abdominal distention, nepro dense and more difficult to digest. Tolerating CPAP, confirmed by ABG.   10/10: BD 10. GEORGE overnight. Neuro stable. (+) gabriel for urinary retention on bladder scan. inc TF to goal rate of 40cc/hr. family leaning toward pursuing trach/PEG. 1/2 amp for FS 81.   10/11: BD 11. GEORGE overnight. Neuro stable. Trach/PEG consults placed.   10/12: BD 12. GEORGE overnight. Neuro stable. MRI brain complete.   10/13: BD 13. Increase flomax. Hold SQH after PM dose for trach tm. IVL.   10/14: BD 14. GEORGE overnight, remains on AC/VC. Gabriel placed for urinary retention. Dc'd free water.  S/p trach with pulm. NGT placed and CXR confirmed in good position.   10/15: BD 15, GEORGE ovn. resumed feeds. spiked 101, pan cx sent.   10/16: BD 16. GEORGE ovn. Lokelma 5mg for K+ 5.4. Started vanc q 24/zosyn for empiric PNA coverage, IVF to 100/hr. PEG held for fever.   10/17: BD 17,  ordered serum osm and urine osm for am. Started sinemet for neurostimulation. Increased cardura to 0.8. Started FW 100q4, dc'd IVF. MRSA negative, dc'd vanc. NGT replaced d/t coiling.   10/18: BD 18, GEORGE overnight, neuro stable. Amantadine added for neurostim. zosyn changed to unasyn for acinetobacter baumannii, failed TOV and required SC  10/19: BD 19, GEORGE ovn. cardura 2mg added for retention. labetalol decreased 200q8, hydralazine decreased 25q8. Gabriel replaced.   10/20: BD20, GEORGE overnight. NGT dislodged, replaced. PEG tomorrow w/ gen surg, FW increased to 150q4 and labetalol decreased to 100q8, lokelma given for hyperkalemia.   10/21: BD 21. POD0 PEG placement with Gen surg. decr labetolol to 50q8, incr. cardura to 0.4, started lokelma and phoslo, dc gabriel POD0 PEG placement with Gen surg.  10/22: BD 22. Plan to start TF today via PEG. dc labetalol, Following ophtho recs. Increased apnea settings - found to be in cheyne-moe respiration. CPAP 5/5.  10/23: BD 23. hydralazine d/c'd, trach collar trial today. Rectal tube placed at 6am.  10/24: BD24, o/n lokelma held due to diarrhea. Free water 100q6 resumed. dc'd tamsulosin, amantadine. Incr'd cardura to 8mg qhs. Dc'd FW. Switched jevity to nepro. gabriel placed for high urine output. Started SL atropine for oral secretions. Dc'd free water.  10/25: BD25, o/n decreased suctioning requirements to > q4hrs, GEORGE. Cr improving, cont phoslo, lokelma held at this time. Gabriel placed yest, cont. Tolerating trach collar. Given 500cc plasmalyte bolus for ANIKA. Dc'd sinemet.   10/26: BD26, o/n resumed lokelma 5mg daily and resumed 100cc free water q6hrs. Change in neuro status with new right pupillary dilation with anisocoria (right pupil 6mm fixed and left pupil 3mm briskly reactive). Given 23.4% NaCl bullet, taken for emergent CTH showing mostly resolved pontine hemorrhage, continued brainstem hypodensity likely edema d/t hemorrhage, no new hemorrhage or infarct, no herniation, mild increase in size of left lateral ventricle. Vitals remaine stable. Na goal > 140.   10/27: BD27, o/n GEORGE.Neuro stable. Pend stepdown with airway bed.   10/28: BD 28. GEORGE overnight. Neuro stable. Miralax ordered. Gabriel removed, pending TOV.  10/29: BD 29. GEORGE o/n. Given 2L NS over 8 hrs for increased BUN/Cr ratio. Gabriel placed for frequent straight cath.   10/30: BD 30.   10/31: BD 31. GEORGE overnight. Na 149, increased free water to 200q6. 1L NS for uptrending BUN.   11/1: BD 32. GEORGE overnight. Given 1L NS for dehydration. Na 146, increased FW to 250q6.   11/2: BD 33 GEORGE overnight, neuro stable, given 500cc bolus for net negative status and tachycardia   11/3: BD 34, GEORGE overnight, neuro stable. Patient remains tachycardic, EKG showing sinus tachycardia, given additional 500cc NS bolus. Febrile to 101.9F, pan cultured (without UA), CXR WNL, given tylenol.   11/4: BD 35, GEORGE overnight, neuro stable. Given 1L NS for tachycardia. sputum (+) for stenotropohomas maltophilia.   11/5: BD 36 GEORGE overnight, neuro stable. Vancomycin dc'd. Chest PT BID. ID consulted, cont zosyn.  11/6: BD 37. blood cx + klebsiella dc zosyn changed to cefepime, CTAP ordered, rpt blood cx sent.    11/7: BD 38. Pending CT A/P, given 250cc bolus and starting maintenance fluids overnight. Pending CT A/P after bolus   11/8: BD 39. CT CAP negative for infection.   11/9: BD 40. GEORGE overnight.  11/10: BD 41. GEORGE overnight. desat to 85 on trach collar, O2 inc to 10L and 100%, O2 sat inc to 95. pt tachy to 110s, euvolemic. given tylenol. ABG and CXR ordered. spiked fever, pancultured, RVP negative. AM ABG w pO2 79, rpt w pO2 79. pt appears comfortable, satting 94%.   11/11: BD 42. GEORGE overnight. pt became tachy to 130s, desat to 90 on 100% FiO2 and 10L. suctioned, (+) productive cough. temp 101.4, given 1g IV tylenol and 500cc NS bolus for euvolemia. fever and HR downtrending. LE dopplers negative for dvt  11/12: BD 43, GEORGE ovn, fever and HR downtrending, satting 97% 70% FIO2  11/13: BD 44, GEORGE ovn. started standing tylenol x24 hours for tachycardia. desat to 80s, o2 increased. CXR stable, pending CTA PE protocol.   11/14: BD 45, GEORGE overnight, neuro stable. resp therapy dec FiO2 to 70%.   11/15: BD 46, GEORGE overnight, neuro stable.  Rapid called for desaturation 30s, tachycardic 140s. Patient bagged, 100% fio2, heavily suctioned. CXR/POCUS unremarkable. ABG c/w desaturation. WBC 14.71. Afebrile. O2 improved to 90s and patient upgraded to ICU. ABG paO2 30s improved to 89 on vent. IV Tylenol x 1, sputum sent. Start protonix while o-n vent.   11/16: BD 47. POCUS showed collapsable IVCF, given 1L bolus. Vanco/Cefpime added empriic for PNA, NGT feeds restarted. MRSA swab neg, Vanc DC'd.   11/17: BD 48. GEORGE overnight. 1l bolus for tachycardia. Spiked to 101, cultured. 500cc bolus for tachycardia, tachy to 148 given 25mcg fentanyl, 250cc albumin, 1.5L bolus. 5 IV lopressor with response HR to 100s. +Stenotrophomonas on sputum cx.   11/18: BD 49. GEORGE overnight. Consulted ID, cefepime switched to bactrim x7days. Started hydrochlorothiazine 12.5mg daily.  11/19: BD 50. Tachy 120s, given tylenol and 500cc NS. Tolerating 5/5, switched to TCx. Holding phos binder. D/c Bactrim. D/c gabriel, f/u TOV. Dc'd PPI.   11/20: BD 51. GEORGE ovn. 1600 satting low 90s, mildly tachy to 110s, afebrile, RR wnl. O2 improved to mid 90s while inc O2 to 100% on TCx. CPAP 5/5 placed back on.  11/21: BD 52, GEORGE ovn, tolerating CPAP 5/5. Switch to trach collar during the day if tolerating well. HCTZ held for Cr bump, straight cath frequence increased to q4  11/22: BD 53, GEORGE ovn. Resumed phoslo. Gabriel placed. Resumed HCTZ.   11/23: BD 54. Holding tylenol in setting of possible fever, will require pan cx if febrile. Cr improved today. Cont CPAP. Bowel regimen held i/s/o diarrhea. FOBT negative.  11/24: BD 55. GEORGE overnight. Neuro stable. HCTZ dc'ed, started lisinopril 5. Lokelma dc'ed for K 3.7.   11/25: BD 56, GEORGE overnight. Neuro stable. dc'd lisinopril 5mg. Gabriel dc'd. TOV. 1545 noted to be hypotensive, MAP 50, in supine position on chair, HR 60s, afebrile, O2 96%. Given 1L cc NS bolus, placed back on bed in reverse trendelenberg, improved to map of 66. Neostick at bedside. Vitals check q1h. Dc'ed amlodipine. Failed TOV, bladder scan q6, sc prn. Added back Senna.   11/26: BD 57, GEORGE overnight. Neuro stable. Dc'd phoslo.   11/27: BD 58, GEORGE overnight. Neuro stable.    11/28: BD 59. Gabriel replaced.   11/29: GEORGE.  11/30: GEORGE, neuro stable.   12/1: BD 62, GEORGE overnight  12/2: BD 63, GEORGE overnight.; Given 1L bolus of LR for uptrending BUN/Cr.  12/3: BD 64. Reinstated eye gtt/moisture chamber given increased Rt eye injection  12/4: BD 65. GEORGE overnight. Attempted to speak with ophtho regarding eyelid weight/closure but no answer, full mailbox.   12/5: BD 66, GEORGE overnight, bowel regimen increased and had BM.   12/6: BD 67, GEORGE overnight, neuro stable.  12/7: GEORGE overnight, neuro stable. /110s, given x1 hydralazine 10 mg IVP. Restarted home amlodipine 5mg.  12/8: GEORGE. OOB to chair.     12/11: GEORGE, mucomyst added for thick secretions, simethicone for abd distension, abd xray with stool burden, increased bowel regimen.   12/12: GEORGE overnight.   12/13: GEORGE overnight.   12/14: GEORGE overnight  12/15: o/n Patient became tachycardic HR 120s and 10 minutes later O2 sat dropped as low as 89%. Patient suctioned without improvement in O2 sat and tube feeds found in suction catheter. TFs held.  STAT CXR ordered. STAT labs sent. Respiratory therapist called to bedside and patient trach connected to ventilator. After connection to ventilator and further suctioning O2 sat improved to 97% but patient HR remains 120-130s. Upgraded to NSICU for further management. Vancomycin and zosyn started. CTA  chest PE protocol and CTH ordered. Blood cultures sent.given 500cc bolus, rpt ABG sent pO2 243, CTH and CTA chest done. FS while NPO, FiO2 dec 50 pending ABG. sputum cx positive for few GPC and GNR.   12/16: GEORGE overnight, restarted amlodipine, troponin 75   12/17: GEORGE overnight, neuro stable. Attempted CPAP this morning, did not tolerate and back on full vent support. Dc'd Vanc. Tachycardia to 140s, noted extremities to be twitching along with jaw twitching. Given 2g of Keppra, total 4mg ativan and placed on EEG, full set of labs and lactate negative. Resumed trickle feeds at 20cc/hr. Given 250cc albumin for tachycardia.   12/18: GEORGE overnight. Neuro stable. CTH stable. EEG negative and dc'd.   12/19: GEORGE overnight. neuro stable. SIMV most of day, AC/VC at night.   12/20: GEORGE overnight, neuro stable. remains on VC/AC  12/21: GEORGE ovn. 1L bolus for tachy to 120s-130s.   12/22: GEORGE ovn. Tachy to 120s, given tylenol and IVF. 2mg IV ativan given for L jaw twitching. Sx resolved for 3 minutes and started again. Epilepsy contacted. Given 2mg IV ativan. Continued twitching. Increased keppra to 1500mg BID, 2mg ativan given. Propranolol started for refractory tachycardia. vEEG ordered. albumin given. POCUS performed, no b lines. Started on fosphenytoin, loaded w 20mg/kg then 100mg TID. febrile, pancx, started cefepime 2g q8, vancomycin  12/23: GEORGE ovn. +focal motor seizures on eeg. ID consulted. Vancomycin dc'ed as per ID.  12/24: GEORGE ovn, neuro stable. Baclofen 5 mg q12 started for hiccups. Na 129 from 134. Urine lytes c/w SIADH. Given 250cc 3% bolus. CT chest for infection w/u - f/u read. Repeat Na 136. UA negative  12/25: GEORGE ovn.   12/26: GEORGE ovn  12/27: GEORGE overnight. Blood cx neg @ 4 days, DC cefepime per medicine (sputum colonized).   12/28: Desaturation o/n to 80's, CXR obtained, pulse ox changed and sats resolved. Na 133 from 138, 250cc 3% bolus given. Cefepime resumed until 12/30 per ID. Rpt Na 138.  12/29- 1/18: GEORGE     OVERNIGHT EVENTS: GEORGE overnight     Vital Signs Last 24 Hrs  T(C): 36.4 (17 Jan 2025 20:42), Max: 36.9 (17 Jan 2025 04:43)  T(F): 97.6 (17 Jan 2025 20:42), Max: 98.4 (17 Jan 2025 04:43)  HR: 72 (17 Jan 2025 23:49) (60 - 92)  BP: 121/84 (17 Jan 2025 23:49) (121/84 - 145/101)  BP(mean): 99 (17 Jan 2025 23:49) (98 - 119)  RR: 16 (17 Jan 2025 23:49) (14 - 18)  SpO2: 100% (17 Jan 2025 23:49) (94% - 100%)    Parameters below as of 17 Jan 2025 23:49  Patient On (Oxygen Delivery Method): ventilator    O2 Concentration (%): 40    I&O's Summary    16 Jan 2025 07:01  -  17 Jan 2025 07:00  --------------------------------------------------------  IN: 820 mL / OUT: 2050 mL / NET: -1230 mL    17 Jan 2025 07:01  -  18 Jan 2025 00:59  --------------------------------------------------------  IN: 1850 mL / OUT: 1700 mL / NET: 150 mL    PHYSICAL EXAM:  Constitutional: Pt found in bed in NAD   ENT: PERRL, vertical EOMi  Respiratory: +trach, breathing non-labored, symmetrical chest wall movement  Cardiovascuar: RRR, no murmurs  Gastrointestinal: +PEG, abdomen soft, non tender  Neurological:  AAOX0. Verbal function not intact  Motor: RUE handgrip spontaneously, RLE wiggles toes, LUE 0/5, LLE withdraws  Pronator Drift: unable to assess  Wounds/Drains: N/A    DIET:  [] NPO  [] Mechanical  [X] Tube feeds    LABS:                        10.8   5.83  )-----------( 200      ( 17 Jan 2025 06:35 )             32.9     01-17    140  |  106  |  46[H]  ----------------------------<  102[H]  4.0   |  25  |  1.23    Ca    9.0      17 Jan 2025 06:35  Phos  3.4     01-17  Mg     2.1     01-17    Urinalysis Basic - ( 17 Jan 2025 06:35 )    Color: x / Appearance: x / SG: x / pH: x  Gluc: 102 mg/dL / Ketone: x  / Bili: x / Urobili: x   Blood: x / Protein: x / Nitrite: x   Leuk Esterase: x / RBC: x / WBC x   Sq Epi: x / Non Sq Epi: x / Bacteria: x    Allergies    Allergy Status Unknown    Intolerances      MEDICATIONS:  Antibiotics:  piperacillin/tazobactam IVPB.. 3.375 Gram(s) IV Intermittent every 8 hours    Neuro:  acetaminophen   Oral Liquid .. 650 milliGRAM(s) Oral every 6 hours PRN  baclofen 5 milliGRAM(s) Oral every 12 hours  levETIRAcetam 1500 milliGRAM(s) Oral two times a day  phenytoin   Suspension 100 milliGRAM(s) Oral every 8 hours    Anticoagulation:  heparin   Injectable 5000 Unit(s) SubCutaneous every 8 hours    OTHER:  acetylcysteine 10%  Inhalation 4 milliLiter(s) Inhalation every 6 hours  albuterol/ipratropium for Nebulization 3 milliLiter(s) Nebulizer every 6 hours  amLODIPine   Tablet 5 milliGRAM(s) Oral daily  artificial tears (preservative free) Ophthalmic Solution 1 Drop(s) Right EYE every 4 hours  chlorhexidine 0.12% Liquid 15 milliLiter(s) Oral Mucosa every 12 hours  chlorhexidine 2% Cloths 1 Application(s) Topical <User Schedule>  doxazosin 8 milliGRAM(s) Oral at bedtime  erythromycin   Ointment 1 Application(s) Right EYE at bedtime  fluticasone propionate 50 MICROgram(s)/spray Nasal Spray 1 Spray(s) Both Nostrils two times a day  influenza   Vaccine 0.5 milliLiter(s) IntraMuscular once  ofloxacin 0.3% Solution 1 Drop(s) Right EYE four times a day  pantoprazole  Injectable 40 milliGRAM(s) IV Push daily  petrolatum Ophthalmic Ointment 1 Application(s) Both EYES two times a day  propranolol 10 milliGRAM(s) Oral every 8 hours  sodium chloride 0.65% Nasal 1 Spray(s) Both Nostrils two times a day    CULTURES:  Culture Results:   >=3 organisms. Probable collection contamination. (01-12 @ 15:25)  Culture Results:   No growth at 5 days (01-12 @ 12:50)    ASSESSMENT:  48M PMH ?stroke/MI present to [facility] after collapsing at work. Decorticate posturing, vomiting, intubated for airway protection. Found to have brainstem hemorrhage (NIHSS 33, ICH score 3). Transferred to [facility] for further management. s/p trach 10/14. s/p peg 10/21. Re-upgrade to ICU 2/2 desaturation event and suctioning requirements 11/15. Re-upgrade to NSICU 12/15 2/2 desaturation and tachycardia.    AMS    Intubate    Handoff    MEWS Score    Acute myocardial infarction    CVA (cerebral vascular accident)    Intracerebral hemorrhage of brain stem    Brainstem stroke    Brain stem stroke syndrome    Brain stem hemorrhage    Brain stem stroke syndrome    Hemorrhagic stroke    Brainstem stroke    Encephalopathy acute    Functional quadriplegia    Advanced care planning/counseling discussion    Encounter for palliative care    Pontine hemorrhage    Neurogenic dysphagia    Chronic respiratory failure    Acute kidney injury superimposed on CKD    Acute urinary retention    Hypertensive emergency    Sepsis, unspecified organism    Sepsis    Gram-negative bacteremia    Dyspnea    Percutaneous tracheal puncture    Altered mental status examination    EGD, with PEG    AMS    Room Service Assist    [email or web address]    PLAN:  Neuro:  - neuro/vitals q4h  - pain control: tylenol prn  - seizure tx: keppra 1500mg BID, phenytoin 100mg PO TID  - vEEG (10/3-4) negative, (10/17-10/19) negative, (12/17-12/18) negative, (12/22-12/25) +focal motor seizures not correlating w/ EEG  - CTH 9/30: enlarged pontine hemorrhage, CTH 10/3: stable, CTH 10/25: mostly resolved pontine hemorrhage, CTH 12/15: R mastoid air cell opacification; acute otitis media vs sterile effusion, CTH 12/18: stable.  - MRI brain 10/12: parenchymal hemorrhage, acute/subacute R cerebellar stroke      CV:  - -160  - tachycardia: propranolol 10mg q8  - HTN: amlodipine 5mg  - echo (9/30) EF 75%, repeat 12/16: 57%    Resp:  - trach to vent, AC/VC 40/400/14/6  - Secretions: duonebs/mucomyst/chest PT q6h     GI:  - TF via PEG (placed 10/21 by gen surg)  - bowel regimen held for loose stool, last BM 1/17  - baclofen 5q12 for hiccups     Renal:  - IVL, FW 200q4 for hydration   - Urinary retenion: Gabriel removed 1/7, Voiding  - CKD: trend BUN/Cr  - renal US 10/1, 10/8, 1/15: Increased bilateral renal echogenicity consistent with medical renal disease    Endo:  - A1c 5.4    Heme:  - DVT ppx: SCDs, SQH 5000u q8h   - LE dopplers negative 12/16    ID:  - last pan cx 1/12, zosyn (1/12 -1/18)  - empiric PNA: zosyn (1/12- ), cefepime (12/22-12/30), s/p vanc (12/22-12/23), s/p zosyn (12/15-12/20), s/p vanc (12/15-12/16)  - s/p Stenotrophomonas maltophilia PNA: s/p Bactrim (11/18-11/19) s/p Cefepime (11/16-11/18)  - 11/3, (+) sputum for stenotrophomas maltophlia, blood cx (+) klebsiella, cefepime 2gq12 (11/6 - 11/12)   - empiric tx: s/p zosyn (11/3-11/6) , s/p vanc  (11/3-11/5)  - S/p Ancef (10/4-10/14) for PNA, and s/p Unasyn (10/18-10/23) +actinobacter baumanii     MISC:  - ophtho consult for keratitis  - erythromycin ointment R eye q4hrs, ofloxacin ointment R eye QID, artificial tears R eye q2hrs, moisture chamber at bedtime    Dispo: SDU status, DNR, pending PRUCOL for benefits     D/w Dr. D'Amico  - Ibuprofen 600mg every 6 hours as needed for pain.  - Acetaminophen 650mg every 6 hours as needed for pain.  - Pelvic rest.   - Please bring all documentation from your ED visit to any related future follow up appointment.  - Please call to schedule follow up appointment with your primary care physician within 24-48 hours.  - Please seek immediate medical attention or return to the ED for any new/worsening, signs/symptoms, or concerns.    Feel better!     [facility]  Obstetrician-Gynecologist  [address] [place], [place] [address]  Phone: [phone]   Pelvic Pain, Female    Pelvic pain is pain in your lower abdomen, below your belly button and between your hips. The pain may start suddenly (be acute), keep coming back (be recurring), or last a long time (become chronic). Pelvic pain that lasts longer than 6 months is considered chronic.    Pelvic pain may affect your:    Reproductive organs.  Urinary system.  Digestive tract.  Musculoskeletal system.    There are many potential causes of pelvic pain. Sometimes, the pain can be a result of digestive or urinary conditions, strained muscles or ligaments, or reproductive conditions. Sometimes the cause of pelvic pain is not known.    Follow these instructions at home:     Take over-the-counter and prescription medicines only as told by your health care provider.  Rest as told by your health care provider.  Do not have sex if it hurts.  Keep a journal of your pelvic pain. Write down:    When the pain started.  Where the pain is located.  What seems to make the pain better or worse, such as food or your period (menstrual cycle).  Any symptoms you have along with the pain.  Keep all follow-up visits as told by your health care provider. This is important.    Contact a health care provider if:  Medicine does not help your pain.  Your pain comes back.  You have new symptoms.  You have abnormal vaginal discharge or bleeding, including bleeding after menopause.  You have a fever or chills.  You are constipated.  You have blood in your urine or stool.  You have foul-smelling urine.  You feel weak or light-headed.    Get help right away if:  You have sudden severe pain.  Your pain gets steadily worse.  You have severe pain along with fever, nausea, vomiting, or excessive sweating.  You lose consciousness.    Summary  Pelvic pain is pain in your lower abdomen, below your belly button and between your hips.  There are many potential causes of pelvic pain.  Keep a journal of your pelvic pain.    ADDITIONAL NOTES AND INSTRUCTIONS    Please follow up with your Primary MD in 24-48 hr.  Seek immediate medical care for any new/worsening signs or symptoms.

## 2025-08-11 ENCOUNTER — APPOINTMENT (OUTPATIENT)
Dept: ANTEPARTUM | Facility: CLINIC | Age: 35
End: 2025-08-11
Payer: COMMERCIAL

## 2025-08-11 ENCOUNTER — ASOB RESULT (OUTPATIENT)
Age: 35
End: 2025-08-11

## 2025-08-11 ENCOUNTER — APPOINTMENT (OUTPATIENT)
Dept: OBGYN | Facility: CLINIC | Age: 35
End: 2025-08-11

## 2025-08-11 PROCEDURE — 76830 TRANSVAGINAL US NON-OB: CPT

## 2025-08-11 PROCEDURE — 76856 US EXAM PELVIC COMPLETE: CPT | Mod: 59

## 2025-09-13 ENCOUNTER — APPOINTMENT (OUTPATIENT)
Dept: OBGYN | Facility: CLINIC | Age: 35
End: 2025-09-13

## 2025-09-13 VITALS
DIASTOLIC BLOOD PRESSURE: 54 MMHG | HEIGHT: 64 IN | WEIGHT: 156.25 LBS | SYSTOLIC BLOOD PRESSURE: 92 MMHG | BODY MASS INDEX: 26.67 KG/M2

## 2025-09-13 DIAGNOSIS — R87.610 ATYPICAL SQUAMOUS CELLS OF UNDETERMINED SIGNIFICANCE ON CYTOLOGIC SMEAR OF CERVIX (ASC-US): ICD-10-CM

## 2025-09-13 DIAGNOSIS — Z12.39 ENCOUNTER FOR OTHER SCREENING FOR MALIGNANT NEOPLASM OF BREAST: ICD-10-CM

## 2025-09-13 DIAGNOSIS — A64 UNSPECIFIED SEXUALLY TRANSMITTED DISEASE: ICD-10-CM

## 2025-09-13 DIAGNOSIS — Z30.431 ENCOUNTER FOR ROUTINE CHECKING OF INTRAUTERINE CONTRACEPTIVE DEVICE: ICD-10-CM

## 2025-09-13 DIAGNOSIS — Z12.4 ENCOUNTER FOR SCREENING FOR MALIGNANT NEOPLASM OF CERVIX: ICD-10-CM

## 2025-09-13 DIAGNOSIS — Z01.419 ENCOUNTER FOR GYNECOLOGICAL EXAMINATION (GENERAL) (ROUTINE) W/OUT ABNORMAL FINDINGS: ICD-10-CM

## 2025-09-13 DIAGNOSIS — R87.810 ATYPICAL SQUAMOUS CELLS OF UNDETERMINED SIGNIFICANCE ON CYTOLOGIC SMEAR OF CERVIX (ASC-US): ICD-10-CM

## 2025-09-13 PROCEDURE — 99459 PELVIC EXAMINATION: CPT

## 2025-09-13 PROCEDURE — 99395 PREV VISIT EST AGE 18-39: CPT

## 2025-09-15 PROBLEM — Z12.39 BREAST SCREENING: Status: ACTIVE | Noted: 2025-09-15

## 2025-09-15 LAB — HPV HIGH+LOW RISK DNA PNL CVX: NOT DETECTED

## 2025-09-19 LAB — CYTOLOGY CVX/VAG DOC THIN PREP: ABNORMAL

## (undated) DEVICE — DRSG DERMABOND 0.7ML

## (undated) DEVICE — XI ARM CLIP APPLIER LARGE

## (undated) DEVICE — XI ARM PERMANENT CAUTERY HOOK

## (undated) DEVICE — GLV 7.5 PROTEXIS (BLUE)

## (undated) DEVICE — VENODYNE/SCD SLEEVE CALF MEDIUM

## (undated) DEVICE — DRAPE TOWEL BLUE STICKY

## (undated) DEVICE — PACK ROBOTIC

## (undated) DEVICE — GLV 7.5 PROTEXIS (WHITE)

## (undated) DEVICE — XI CORD BIPOLAR CAUTERY (BLUE)

## (undated) DEVICE — XI CORD MONOPOLAR CAUTERY (GREEN)

## (undated) DEVICE — XI DRAPE ARM

## (undated) DEVICE — DRAPE GENERAL ENDOSCOPY

## (undated) DEVICE — Device

## (undated) DEVICE — XI ARM FORCEP PROGRASP 8MM

## (undated) DEVICE — XI SEAL UNIVERSIAL 5-12MM

## (undated) DEVICE — ENDOCATCH 10MM SPECIMEN POUCH

## (undated) DEVICE — SOL IRR POUR H2O 1000ML

## (undated) DEVICE — SUT MONOCRYL 4-0 27" PS-2 UNDYED

## (undated) DEVICE — XI DRAPE COLUMN

## (undated) DEVICE — DRAPE XL SHEET 77X98"

## (undated) DEVICE — SUT VICRYL 0 27" CT-2 UNDYED

## (undated) DEVICE — STAPLER SKIN PROXIMATE

## (undated) DEVICE — SOL IRR POUR NS 0.9% 1000ML

## (undated) DEVICE — ELCTR GROUNDING PAD ADULT COVIDIEN

## (undated) DEVICE — XI OBTURATOR OPTICAL BLADELESS 8MM

## (undated) DEVICE — ELCTR BOVIE PENCIL SMOKE EVACUATION 15FT